# Patient Record
Sex: MALE | Race: OTHER | Employment: FULL TIME | URBAN - METROPOLITAN AREA
[De-identification: names, ages, dates, MRNs, and addresses within clinical notes are randomized per-mention and may not be internally consistent; named-entity substitution may affect disease eponyms.]

---

## 2017-03-10 ENCOUNTER — HOSPITAL ENCOUNTER (EMERGENCY)
Facility: HOSPITAL | Age: 42
Discharge: HOME | End: 2017-03-10
Attending: EMERGENCY MEDICINE
Payer: COMMERCIAL

## 2017-03-10 VITALS
WEIGHT: 240 LBS | HEIGHT: 69 IN | TEMPERATURE: 98 F | BODY MASS INDEX: 35.55 KG/M2 | RESPIRATION RATE: 18 BRPM | DIASTOLIC BLOOD PRESSURE: 67 MMHG | OXYGEN SATURATION: 96 % | HEART RATE: 88 BPM | SYSTOLIC BLOOD PRESSURE: 115 MMHG

## 2017-03-10 DIAGNOSIS — T78.1XXA ADVERSE REACTION TO FOOD, INITIAL ENCOUNTER: Primary | ICD-10-CM

## 2017-03-10 DIAGNOSIS — L50.9 URTICARIA: ICD-10-CM

## 2017-03-10 DIAGNOSIS — I95.1 ORTHOSTASIS: ICD-10-CM

## 2017-03-10 LAB
ALBUMIN SERPL BCP-MCNC: 3.4 G/DL (ref 3.5–4.8)
ALP SERPL-CCNC: 89 U/L (ref 32–100)
ALT SERPL-CCNC: 45 U/L (ref 17–63)
ANION GAP SERPL CALC-SCNC: 10 MMOL/L (ref 0–18)
AST SERPL-CCNC: 27 U/L (ref 15–41)
BASOPHILS # BLD: 0 K/UL (ref 0–0.2)
BASOPHILS NFR BLD: 1 %
BILIRUB DIRECT SERPL-MCNC: 0.1 MG/DL (ref 0–0.2)
BILIRUB SERPL-MCNC: 0.8 MG/DL (ref 0.3–1.2)
BUN SERPL-MCNC: 12 MG/DL (ref 8–20)
BUN/CREAT SERPL: 13.8 (ref 10–20)
CALCIUM SERPL-MCNC: 9.1 MG/DL (ref 8.5–10.5)
CHLORIDE SERPL-SCNC: 103 MMOL/L (ref 95–110)
CO2 SERPL-SCNC: 27 MMOL/L (ref 22–32)
CREAT SERPL-MCNC: 0.87 MG/DL (ref 0.5–1.5)
EOSINOPHIL # BLD: 0.1 K/UL (ref 0–0.7)
EOSINOPHIL NFR BLD: 1 %
ERYTHROCYTE [DISTWIDTH] IN BLOOD BY AUTOMATED COUNT: 14.4 % (ref 11–15)
GLUCOSE SERPL-MCNC: 170 MG/DL (ref 70–99)
HCT VFR BLD AUTO: 45.8 % (ref 41–52)
HGB BLD-MCNC: 15.2 G/DL (ref 13.5–17.5)
LIPASE SERPL-CCNC: 28 U/L (ref 22–51)
LYMPHOCYTES # BLD: 1.7 K/UL (ref 1–4)
LYMPHOCYTES NFR BLD: 17 %
MCH RBC QN AUTO: 28.4 PG (ref 27–32)
MCHC RBC AUTO-ENTMCNC: 33.2 G/DL (ref 32–37)
MCV RBC AUTO: 85.7 FL (ref 80–100)
MONOCYTES # BLD: 0.3 K/UL (ref 0–1)
MONOCYTES NFR BLD: 3 %
NEUTROPHILS # BLD AUTO: 7.9 K/UL (ref 1.8–7.7)
NEUTROPHILS NFR BLD: 79 %
OSMOLALITY UR CALC.SUM OF ELEC: 294 MOSM/KG (ref 275–295)
PLATELET # BLD AUTO: 123 K/UL (ref 140–400)
PMV BLD AUTO: 10.7 FL (ref 7.4–10.3)
POTASSIUM SERPL-SCNC: 3.6 MMOL/L (ref 3.3–5.1)
PROT SERPL-MCNC: 6.8 G/DL (ref 5.9–8.4)
RBC # BLD AUTO: 5.35 M/UL (ref 4.5–5.9)
SODIUM SERPL-SCNC: 140 MMOL/L (ref 136–144)
WBC # BLD AUTO: 10 K/UL (ref 4–11)

## 2017-03-10 PROCEDURE — S0028 INJECTION, FAMOTIDINE, 20 MG: HCPCS | Performed by: EMERGENCY MEDICINE

## 2017-03-10 PROCEDURE — 85025 COMPLETE CBC W/AUTO DIFF WBC: CPT | Performed by: EMERGENCY MEDICINE

## 2017-03-10 PROCEDURE — 99284 EMERGENCY DEPT VISIT MOD MDM: CPT

## 2017-03-10 PROCEDURE — 80076 HEPATIC FUNCTION PANEL: CPT | Performed by: EMERGENCY MEDICINE

## 2017-03-10 PROCEDURE — 96375 TX/PRO/DX INJ NEW DRUG ADDON: CPT

## 2017-03-10 PROCEDURE — 93005 ELECTROCARDIOGRAM TRACING: CPT

## 2017-03-10 PROCEDURE — 96374 THER/PROPH/DIAG INJ IV PUSH: CPT

## 2017-03-10 PROCEDURE — 96361 HYDRATE IV INFUSION ADD-ON: CPT

## 2017-03-10 PROCEDURE — 93010 ELECTROCARDIOGRAM REPORT: CPT | Performed by: EMERGENCY MEDICINE

## 2017-03-10 PROCEDURE — 80048 BASIC METABOLIC PNL TOTAL CA: CPT | Performed by: EMERGENCY MEDICINE

## 2017-03-10 PROCEDURE — 83690 ASSAY OF LIPASE: CPT | Performed by: EMERGENCY MEDICINE

## 2017-03-10 RX ORDER — ONDANSETRON 4 MG/1
4 TABLET, ORALLY DISINTEGRATING ORAL EVERY 6 HOURS PRN
Qty: 12 TABLET | Refills: 0 | Status: SHIPPED | OUTPATIENT
Start: 2017-03-10 | End: 2017-03-13

## 2017-03-10 RX ORDER — METHYLPREDNISOLONE SODIUM SUCCINATE 125 MG/2ML
125 INJECTION, POWDER, LYOPHILIZED, FOR SOLUTION INTRAMUSCULAR; INTRAVENOUS ONCE
Status: COMPLETED | OUTPATIENT
Start: 2017-03-10 | End: 2017-03-10

## 2017-03-10 RX ORDER — DIPHENHYDRAMINE HYDROCHLORIDE 50 MG/ML
INJECTION INTRAMUSCULAR; INTRAVENOUS
Status: COMPLETED
Start: 2017-03-10 | End: 2017-03-10

## 2017-03-10 RX ORDER — DIPHENHYDRAMINE HYDROCHLORIDE 50 MG/ML
50 INJECTION INTRAMUSCULAR; INTRAVENOUS ONCE
Status: COMPLETED | OUTPATIENT
Start: 2017-03-10 | End: 2017-03-10

## 2017-03-10 RX ORDER — PREDNISONE 20 MG/1
40 TABLET ORAL DAILY
Qty: 8 TABLET | Refills: 0 | Status: SHIPPED | OUTPATIENT
Start: 2017-03-10 | End: 2017-03-14

## 2017-03-10 RX ORDER — FAMOTIDINE 10 MG/ML
20 INJECTION, SOLUTION INTRAVENOUS ONCE
Status: COMPLETED | OUTPATIENT
Start: 2017-03-10 | End: 2017-03-10

## 2017-03-10 RX ORDER — FAMOTIDINE 20 MG/1
20 TABLET ORAL 2 TIMES DAILY
Qty: 28 TABLET | Refills: 0 | Status: SHIPPED | OUTPATIENT
Start: 2017-03-10 | End: 2017-03-24

## 2017-03-11 NOTE — ED NOTES
Pt stated that he is feeling much better, denies being dizzy, stated that he is sleepy from Benadryl, no signs of hives in his neck and face noted.

## 2017-03-11 NOTE — ED PROVIDER NOTES
Patient Seen in: [facility] AND [facility] Emergency Department    History   Patient presents with:  Dizziness (neurologic)    Stated Complaint: lightheaded nauseated     HPI    40 yo M without PMH presenting for evaluation of nausea associated with diaphoresis an Negative for chest pain and palpitations. Gastrointestinal: Negative for vomiting and abdominal pain. (+) nausea. Neurological: Negative for syncope and headaches. (+) lightheadedness.     Positive for stated complaint: lightheaded nauseated  Other syste ---------                               -----------         ------                     CBC W/ DIFFERENTIAL[[id number]]          Abnormal            Final result                 Please view results for these tests on the individual orders.    RAINBOW DRAW B 0    ondansetron 4 MG Oral Tablet Dispersible  Take 1 tablet (4 mg total) by mouth every 6 (six) hours as needed for Nausea. Qty: 12 tablet Refills: 0    predniSONE 20 MG Oral Tab  Take 2 tablets (40 mg total) by mouth daily.   Qty: 8 tablet Refills: 0

## 2017-03-11 NOTE — ED INITIAL ASSESSMENT (HPI)
Patient to ED via EMS for eval dizziness/nausea, which is resolving. Patient states he ate chinese food 1 hr ago and began to feel dizzy/nauseated. Appeared pale/cool/diaphoretic per EMS. Received 4mg zofran IVP. States he feels a little better.

## 2017-03-17 ENCOUNTER — OFFICE VISIT (OUTPATIENT)
Dept: FAMILY MEDICINE CLINIC | Facility: CLINIC | Age: 42
End: 2017-03-17

## 2017-03-17 ENCOUNTER — APPOINTMENT (OUTPATIENT)
Dept: LAB | Age: 42
End: 2017-03-17
Attending: FAMILY MEDICINE
Payer: COMMERCIAL

## 2017-03-17 VITALS
WEIGHT: 254 LBS | TEMPERATURE: 98 F | HEART RATE: 91 BPM | HEIGHT: 69 IN | BODY MASS INDEX: 37.62 KG/M2 | DIASTOLIC BLOOD PRESSURE: 93 MMHG | SYSTOLIC BLOOD PRESSURE: 142 MMHG

## 2017-03-17 DIAGNOSIS — G47.33 OSA (OBSTRUCTIVE SLEEP APNEA): ICD-10-CM

## 2017-03-17 DIAGNOSIS — T78.2XXA ANAPHYLAXIS, INITIAL ENCOUNTER: ICD-10-CM

## 2017-03-17 DIAGNOSIS — T78.2XXA ANAPHYLAXIS, INITIAL ENCOUNTER: Primary | ICD-10-CM

## 2017-03-17 PROCEDURE — 36415 COLL VENOUS BLD VENIPUNCTURE: CPT

## 2017-03-17 PROCEDURE — 99214 OFFICE O/P EST MOD 30 MIN: CPT | Performed by: FAMILY MEDICINE

## 2017-03-17 PROCEDURE — 86003 ALLG SPEC IGE CRUDE XTRC EA: CPT

## 2017-03-17 PROCEDURE — 82785 ASSAY OF IGE: CPT

## 2017-03-17 PROCEDURE — 99212 OFFICE O/P EST SF 10 MIN: CPT | Performed by: FAMILY MEDICINE

## 2017-03-17 RX ORDER — EPINEPHRINE 0.3 MG/.3ML
0.3 INJECTION SUBCUTANEOUS ONCE
Qty: 2 EACH | Refills: 5 | Status: SHIPPED | OUTPATIENT
Start: 2017-03-17 | End: 2017-03-17

## 2017-03-17 NOTE — PROGRESS NOTES
Blood pressure 142/93, pulse 91, temperature 98.2 °F (36.8 °C), temperature source Oral, height 5' 9\" (1.753 m), weight 254 lb (115.214 kg). Patient presents today following up for hospitalization for allergy to food that occurred about a week ago.   He r

## 2017-03-21 LAB
A ALTERNATA IGE QN: <0.1 KUA/L (ref ?–0.1)
C HERBARUM IGE QN: <0.1 KUA/L (ref ?–0.1)
CAT DANDER IGE QN: <0.1 KUA/L (ref ?–0.1)
CLAM IGE QN: 0.25 KUA/L (ref ?–0.1)
CODFISH IGE QN: <0.1 KUA/L (ref ?–0.1)
COMMON RAGWEED IGE QN: <0.1 KUA/L (ref ?–0.1)
CORN IGE QN: <0.1 KUA/L (ref ?–0.1)
COW MILK IGE QN: <0.1 KUA/L (ref ?–0.1)
D FARINAE IGE QN: 5.12 KUA/L (ref ?–0.1)
DOG DANDER IGE QN: <0.1 KUA/L (ref ?–0.1)
EGG WHITE IGE QN: <0.1 KUA/L (ref ?–0.1)
GOOSEFOOT IGE QN: <0.1 KUA/L (ref ?–0.1)
HOUSE DUST HS IGE QN: 0.62 KUA/L (ref ?–0.1)
IGE SERPL-ACNC: 121 KU/L (ref 2–214)
IGE SERPL-ACNC: 121 KU/L (ref 2–214)
KENT BLUE GRASS IGE QN: <0.1 KUA/L (ref ?–0.1)
PEANUT IGE QN: <0.1 KUA/L (ref ?–0.1)
PER RYE GRASS IGE QN: <0.1 KUA/L (ref ?–0.1)
SCALLOP IGE QN: <0.1 KUA/L (ref ?–0.1)
SESAME SEED IGE QN: <0.1 KUA/L (ref ?–0.1)
SHRIMP IGE QN: 0.51 KUA/L (ref ?–0.1)
SOYBEAN IGE QN: <0.1 KUA/L (ref ?–0.1)
WALNUT IGE QN: <0.1 KUA/L (ref ?–0.1)
WHEAT IGE QN: <0.1 KUA/L (ref ?–0.1)
WHITE ELM IGE QN: <0.1 KUA/L (ref ?–0.1)
WHITE OAK IGE QN: <0.1 KUA/L (ref ?–0.1)

## 2017-03-22 ENCOUNTER — TELEPHONE (OUTPATIENT)
Dept: FAMILY MEDICINE CLINIC | Facility: CLINIC | Age: 42
End: 2017-03-22

## 2017-03-22 DIAGNOSIS — G47.33 OSA (OBSTRUCTIVE SLEEP APNEA): Primary | ICD-10-CM

## 2017-03-28 NOTE — TELEPHONE ENCOUNTER
Unable to leave VM due to vm being full. Need to know if pt has scheduled sleep study or has been notified or ordered entered.  Please transfer to [phone] till 2pm today

## 2017-04-03 NOTE — TELEPHONE ENCOUNTER
Unable to leave VM due to mailbox being full. If pt call back please transfer to [phone] till 7pm today.

## 2017-04-10 ENCOUNTER — OFFICE VISIT (OUTPATIENT)
Dept: ALLERGY | Facility: CLINIC | Age: 42
End: 2017-04-10

## 2017-04-10 ENCOUNTER — NURSE ONLY (OUTPATIENT)
Dept: ALLERGY | Facility: CLINIC | Age: 42
End: 2017-04-10

## 2017-04-10 VITALS
HEART RATE: 88 BPM | HEIGHT: 69 IN | BODY MASS INDEX: 38.51 KG/M2 | OXYGEN SATURATION: 93 % | SYSTOLIC BLOOD PRESSURE: 130 MMHG | WEIGHT: 260 LBS | TEMPERATURE: 99 F | DIASTOLIC BLOOD PRESSURE: 90 MMHG | RESPIRATION RATE: 17 BRPM

## 2017-04-10 DIAGNOSIS — J30.9 ALLERGIC RHINITIS, UNSPECIFIED ALLERGIC RHINITIS TRIGGER, UNSPECIFIED RHINITIS SEASONALITY: Primary | ICD-10-CM

## 2017-04-10 DIAGNOSIS — Z91.018 FOOD ALLERGY: Primary | ICD-10-CM

## 2017-04-10 DIAGNOSIS — J30.9 ALLERGIC RHINITIS, UNSPECIFIED ALLERGIC RHINITIS TRIGGER, UNSPECIFIED RHINITIS SEASONALITY: ICD-10-CM

## 2017-04-10 DIAGNOSIS — T78.40XA ALLERGIC REACTION, INITIAL ENCOUNTER: ICD-10-CM

## 2017-04-10 PROCEDURE — 99212 OFFICE O/P EST SF 10 MIN: CPT | Performed by: ALLERGY & IMMUNOLOGY

## 2017-04-10 PROCEDURE — 99244 OFF/OP CNSLTJ NEW/EST MOD 40: CPT | Performed by: ALLERGY & IMMUNOLOGY

## 2017-04-10 PROCEDURE — 95024 IQ TESTS W/ALLERGENIC XTRCS: CPT | Performed by: ALLERGY & IMMUNOLOGY

## 2017-04-10 PROCEDURE — 95004 PERQ TESTS W/ALRGNC XTRCS: CPT | Performed by: ALLERGY & IMMUNOLOGY

## 2017-04-10 RX ORDER — EPINEPHRINE 0.3 MG/.3ML
INJECTION INTRAMUSCULAR
COMMUNITY
Start: 2017-03-17 | End: 2020-03-19

## 2017-04-10 NOTE — PROGRESS NOTES
Karolyn Valles is a 43year old male. HPI:   Patient presents with:  Food Allergy: nausea, red eyes, SOB, dizziness and tachycardia after eating at a chinese buffet.   Pt called 911, facial redness and swelling and hives on chest.  ER treated with shruti Pt has an epipen     Headed to [place] in 2-3 weeks for vacation     Hx of asthma, ad, or ar: denies         HISTORY:  Past Medical History   Diagnosis Date   • Myopia of both eyes 2001   • Other ill-defined conditions 2007     per Next[employer]:  \"[place], seizures  Psychiatric:  Negative for inappropriate interaction and psychiatric symptoms  Respiratory:  Negative for cough, dyspnea and wheezing      PHYSICAL EXAM:   Constitutional: responsive, no acute distress noted  Head/Face: NC/Atraumatic  Eyes/Vision ingestion, 3 hours.   Patient to contact my office if interested in pursuing oral challenge to Advil which is Motrin which is ibuprofen  Discussed with patient that Advil and Motrin are brand names for the generic ibuprofen  Recommend Tylenol in place of ib

## 2017-04-17 ENCOUNTER — TELEPHONE (OUTPATIENT)
Dept: FAMILY MEDICINE CLINIC | Facility: CLINIC | Age: 42
End: 2017-04-17

## 2017-04-17 NOTE — TELEPHONE ENCOUNTER
Per pt, he received an Order thru mail but would like to know what to do?  pls see comm dated 03/22/2017.

## 2017-04-17 NOTE — TELEPHONE ENCOUNTER
Procedure:  OP [facility] ALT REFERRAL HOME SLEEP APNEA TEST [[id number]]          Indications/Symptoms: Apnea  Comorbidities: (Check all that apply): None  Follow-Up Care: Post-test consultation with sleep specialist  [facility] Order #:   [id number]    Patient was info

## 2017-06-08 ENCOUNTER — OFFICE VISIT (OUTPATIENT)
Dept: SLEEP CENTER | Age: 42
End: 2017-06-08
Attending: FAMILY MEDICINE
Payer: COMMERCIAL

## 2017-06-08 DIAGNOSIS — G47.33 OSA (OBSTRUCTIVE SLEEP APNEA): Primary | ICD-10-CM

## 2017-06-08 PROCEDURE — 95806 SLEEP STUDY UNATT&RESP EFFT: CPT

## 2017-07-10 ENCOUNTER — OFFICE VISIT (OUTPATIENT)
Dept: FAMILY MEDICINE CLINIC | Facility: CLINIC | Age: 42
End: 2017-07-10

## 2017-07-10 ENCOUNTER — TELEPHONE (OUTPATIENT)
Dept: FAMILY MEDICINE CLINIC | Facility: CLINIC | Age: 42
End: 2017-07-10

## 2017-07-10 VITALS
DIASTOLIC BLOOD PRESSURE: 94 MMHG | HEIGHT: 68 IN | WEIGHT: 255 LBS | SYSTOLIC BLOOD PRESSURE: 141 MMHG | HEART RATE: 81 BPM | TEMPERATURE: 97 F | BODY MASS INDEX: 38.65 KG/M2

## 2017-07-10 DIAGNOSIS — J02.9 ACUTE PHARYNGITIS, UNSPECIFIED ETIOLOGY: ICD-10-CM

## 2017-07-10 DIAGNOSIS — J02.0 STREP PHARYNGITIS: Primary | ICD-10-CM

## 2017-07-10 LAB
CONTROL LINE PRESENT WITH A CLEAR BACKGROUND (YES/NO): YES YES/NO
KIT LOT #: ABNORMAL NUMERIC
STREP GRP A CUL-SCR: POSITIVE

## 2017-07-10 PROCEDURE — 99212 OFFICE O/P EST SF 10 MIN: CPT | Performed by: PHYSICIAN ASSISTANT

## 2017-07-10 PROCEDURE — 99213 OFFICE O/P EST LOW 20 MIN: CPT | Performed by: PHYSICIAN ASSISTANT

## 2017-07-10 PROCEDURE — 87880 STREP A ASSAY W/OPTIC: CPT | Performed by: PHYSICIAN ASSISTANT

## 2017-07-10 RX ORDER — AMOXICILLIN AND CLAVULANATE POTASSIUM 875; 125 MG/1; MG/1
1 TABLET, FILM COATED ORAL 2 TIMES DAILY
Qty: 20 TABLET | Refills: 0 | Status: SHIPPED | OUTPATIENT
Start: 2017-07-10 | End: 2017-07-20

## 2017-07-10 NOTE — TELEPHONE ENCOUNTER
Patient is inquiring about sleep study results done at [address] [place] [address] sleep center 6/8/17. Sleep study ordered by Dr. Nikole Castellano. Results are not in patient chart. Please obtain results from sleep center so patient can be notified of results.

## 2017-07-10 NOTE — PROGRESS NOTES
HPI:    Patient ID: Tana Ibarra is a 43year old male. Patient presents for sore throat for past five days. Patient has been out of town. He has had fever/chills. He has had mild congestion and cough which have improved.   He denies rash, abdomina and dry. Psychiatric: He has a normal mood and affect. Vitals reviewed. ASSESSMENT/PLAN:   Strep pharyngitis  (primary encounter diagnosis)  -Augmentin 875 mg BID for 10 days sent to pharmacy. Medication discussed.  Patient advised to take

## 2017-07-11 NOTE — TELEPHONE ENCOUNTER
Elo Bishop from [place] [address] stated that sleep study exam was done on 6/8/17,but was conducted oxymetry was not recorded. Pt will need to repeat sleep study exam. Elo Bishop stated she will contact patient to schedule repeat sleep study exam.No fu

## 2017-07-11 NOTE — TELEPHONE ENCOUNTER
Spoke to Pauline Vicente from [address] [place] [address] Sleep study department. She states that she call back me back regarding patient Sleep study results. Please transferred to [id number]

## 2017-07-20 ENCOUNTER — OFFICE VISIT (OUTPATIENT)
Dept: SLEEP CENTER | Age: 42
End: 2017-07-20
Attending: FAMILY MEDICINE
Payer: COMMERCIAL

## 2017-07-20 DIAGNOSIS — G47.33 OSA (OBSTRUCTIVE SLEEP APNEA): Primary | ICD-10-CM

## 2017-07-20 PROCEDURE — 95806 SLEEP STUDY UNATT&RESP EFFT: CPT

## 2017-07-25 ENCOUNTER — TELEPHONE (OUTPATIENT)
Dept: FAMILY MEDICINE CLINIC | Facility: CLINIC | Age: 42
End: 2017-07-25

## 2017-07-25 DIAGNOSIS — G47.33 OSA (OBSTRUCTIVE SLEEP APNEA): Primary | ICD-10-CM

## 2017-07-25 NOTE — TELEPHONE ENCOUNTER
Sleep Center - Lisa Naqvi calling states pt has failed two home sleep studies. Lisa Naqvi requesting orders for in lab diagnostic sleep study. Please call when orders approved to schedule pt.

## 2017-07-27 NOTE — TELEPHONE ENCOUNTER
Order has been placed. Awaiting for approval on referral. [place] from [address] has been informed.

## 2017-08-01 ENCOUNTER — TELEPHONE (OUTPATIENT)
Dept: FAMILY MEDICINE CLINIC | Facility: CLINIC | Age: 42
End: 2017-08-01

## 2017-08-01 NOTE — TELEPHONE ENCOUNTER
Actions Requested: appointment today or tomorrow-appointment made for tomorrow 8/02/17  Problem: earache  Onset and Timing: one week  Associated Symptoms: scant clear drainage x 1 last week, muffled hearing  Aggravating by: had  A cold 3 weeks ago  [facility] operation and will call back if needed. Patient was able to restate instructions in their own words, verbalizes understanding and agrees with plan.

## 2017-08-02 ENCOUNTER — OFFICE VISIT (OUTPATIENT)
Dept: INTERNAL MEDICINE CLINIC | Facility: CLINIC | Age: 42
End: 2017-08-02

## 2017-08-02 VITALS
BODY MASS INDEX: 39.86 KG/M2 | HEART RATE: 99 BPM | OXYGEN SATURATION: 93 % | TEMPERATURE: 99 F | WEIGHT: 263 LBS | SYSTOLIC BLOOD PRESSURE: 145 MMHG | HEIGHT: 68 IN | DIASTOLIC BLOOD PRESSURE: 85 MMHG

## 2017-08-02 DIAGNOSIS — H66.92 OTITIS OF LEFT EAR: Primary | ICD-10-CM

## 2017-08-02 DIAGNOSIS — H93.8X2 CONGESTION OF LEFT EAR: ICD-10-CM

## 2017-08-02 LAB
CONTROL LINE PRESENT WITH A CLEAR BACKGROUND (YES/NO): YES YES/NO
KIT LOT #: NORMAL NUMERIC

## 2017-08-02 PROCEDURE — 87880 STREP A ASSAY W/OPTIC: CPT | Performed by: INTERNAL MEDICINE

## 2017-08-02 PROCEDURE — 99212 OFFICE O/P EST SF 10 MIN: CPT | Performed by: INTERNAL MEDICINE

## 2017-08-02 PROCEDURE — 99214 OFFICE O/P EST MOD 30 MIN: CPT | Performed by: INTERNAL MEDICINE

## 2017-08-02 RX ORDER — NAPROXEN 500 MG/1
TABLET ORAL
Qty: 60 TABLET | Refills: 0 | Status: SHIPPED | OUTPATIENT
Start: 2017-08-02 | End: 2018-01-31

## 2017-08-02 RX ORDER — FLUTICASONE PROPIONATE 50 MCG
2 SPRAY, SUSPENSION (ML) NASAL DAILY
Qty: 3 BOTTLE | Refills: 3 | Status: SHIPPED | OUTPATIENT
Start: 2017-08-02 | End: 2018-07-28

## 2017-08-02 RX ORDER — OMEPRAZOLE 20 MG/1
20 CAPSULE, DELAYED RELEASE ORAL
Qty: 30 CAPSULE | Refills: 0 | Status: SHIPPED | OUTPATIENT
Start: 2017-08-02 | End: 2018-01-31

## 2017-08-02 RX ORDER — MONTELUKAST SODIUM 10 MG/1
10 TABLET ORAL NIGHTLY
Qty: 30 TABLET | Refills: 0 | Status: SHIPPED | OUTPATIENT
Start: 2017-08-02 | End: 2018-01-31

## 2017-08-02 NOTE — PATIENT INSTRUCTIONS
Congestion of left ear- physical exam is normal , overall congested ,- will try singular, nasal decongestant, if not feeling better , if any fever , pain call us   High bp - noted - advised to check bp at home or any local phramacy and call us with bp read

## 2017-08-02 NOTE — PROGRESS NOTES
HPI:    Patient ID: Vimal Angelo is a 43year old male.     HPI   He is  Here today for follow up on hs recent uri    He states that he finished abx , he feels better but for one week he is having  Fulness in his left ear , feels like bubbles, congested capsule (20 mg total) by mouth every morning before breakfast. Disp: 30 capsule Rfl: 0   naproxen 500 MG Oral Tab TAKE ONE TABLET BY MOUTH TWO TIMES A DAY WITH MEALS Disp: 60 tablet Rfl: 0   Montelukast Sodium 10 MG Oral Tab Take 1 tablet (10 mg total) by exhibits no maxillary sinus tenderness and no frontal sinus tenderness. Mouth/Throat: Uvula is midline, oropharynx is clear and moist and mucous membranes are normal. Mucous membranes are not cyanotic.  No oropharyngeal exudate, posterior oropharyngeal ed bp at home or any local phramacy and call us with bp reading in 2 days , advised for low salt diet and aerobic exercise 4 times a week for 45 min , follow p with pcp in one month      Orders Placed This Encounter      POC Rapid Strep [[id number]]    Meds This V

## 2017-08-24 ENCOUNTER — OFFICE VISIT (OUTPATIENT)
Dept: SLEEP CENTER | Age: 42
End: 2017-08-24
Attending: FAMILY MEDICINE
Payer: COMMERCIAL

## 2017-08-24 DIAGNOSIS — Z76.89 SLEEP CONCERN: Primary | ICD-10-CM

## 2017-08-24 PROCEDURE — 95811 POLYSOM 6/>YRS CPAP 4/> PARM: CPT

## 2017-08-27 NOTE — PROCEDURES
[address] [facility]  Accredited by the [facility] ([facility]ASM)    PATIENT'S NAME: Tera Marcos   ATTENDING PHYSICIAN: Melanie Trejo. Urmila Uriarte DO   REFERRING PHYSICIAN: Melanie Trejo.  Urmila Uriarte DO   PATIENT ACCOUNT #: [de-identified] LOCATION: and the spontaneous arousal index of 2.2 events per hour for a combined arousal index of 66.2 events per hour. There were no significant periodic limb movements. The lowest desaturation was to 55%.   The average heart rate was 81 beats per minute and ther normal during both the diagnostic and the treatment analysis, but there was dramatic REM rebound during the treatment analysis and supine REM was demonstrated on the final setting of 15 CWP.     INTERPRETATION:  The data generated from this study is consist

## 2017-08-28 ENCOUNTER — TELEPHONE (OUTPATIENT)
Dept: OTHER | Age: 42
End: 2017-08-28

## 2017-08-28 NOTE — TELEPHONE ENCOUNTER
LMTCB--pt to confirm consult appt with Dr. Carmen Hand 8/30 @ 3pm, [facility], pt needs location information.

## 2017-08-28 NOTE — TELEPHONE ENCOUNTER
Spoke with patient ( verified name and ), reviewed information, patient verbalized understanding and agrees with plan. Doctor [facility] name and number given to the patient. No further questions or concerns at this time.         Notes Recorded by Kellee Tuttle

## 2017-08-28 NOTE — TELEPHONE ENCOUNTER
Pt states he needs to schedule an appt with Dr. Baljit Evangelista for sleep apnea, pt received his sleep study results. He needs to be seen as soon as possible as he states the sleep apnea is effecting his job as a .  Pt was told the soonest available appt was no

## 2017-08-28 NOTE — TELEPHONE ENCOUNTER
Pt states he contacted Dr. Nolasco Standing office and there are no openings until sometime next month, states he was told to call back if he was unable to schedule an appt. New encounter sent to pulmonology clinical staff.

## 2017-08-30 ENCOUNTER — OFFICE VISIT (OUTPATIENT)
Dept: PULMONOLOGY | Facility: CLINIC | Age: 42
End: 2017-08-30

## 2017-08-30 VITALS
SYSTOLIC BLOOD PRESSURE: 135 MMHG | OXYGEN SATURATION: 99 % | WEIGHT: 268 LBS | DIASTOLIC BLOOD PRESSURE: 75 MMHG | HEIGHT: 69 IN | RESPIRATION RATE: 18 BRPM | HEART RATE: 97 BPM | BODY MASS INDEX: 39.69 KG/M2

## 2017-08-30 DIAGNOSIS — G47.33 OSA (OBSTRUCTIVE SLEEP APNEA): Primary | ICD-10-CM

## 2017-08-30 DIAGNOSIS — E66.09 OBESITY DUE TO EXCESS CALORIES WITHOUT SERIOUS COMORBIDITY, UNSPECIFIED CLASSIFICATION: ICD-10-CM

## 2017-08-30 PROCEDURE — 99243 OFF/OP CNSLTJ NEW/EST LOW 30: CPT | Performed by: INTERNAL MEDICINE

## 2017-08-30 PROCEDURE — 99212 OFFICE O/P EST SF 10 MIN: CPT | Performed by: INTERNAL MEDICINE

## 2017-08-30 RX ORDER — FLUTICASONE PROPIONATE 50 MCG
2 SPRAY, SUSPENSION (ML) NASAL DAILY
Qty: 1 INHALER | Refills: 4 | Status: SHIPPED | OUTPATIENT
Start: 2017-08-30 | End: 2017-09-29

## 2017-08-30 NOTE — PROGRESS NOTES
[facility], [facility], [facility]    Report of Consultation    Markirstyevelyn Noman Patient Status:  Outpatient    1975 MRN [id number]   Location [facility], [facility], [facility] facility-administered medications for this visit. (Not in a hospital admission)    Allergies  No Known Allergies    Review of Systems:    Pertinent items are noted in HPI. Physical Exam:   Blood pressure 135/75, pulse 97, resp.  rate 18, height 5' 9\

## 2017-09-01 ENCOUNTER — TELEPHONE (OUTPATIENT)
Dept: PULMONOLOGY | Facility: CLINIC | Age: 42
End: 2017-09-01

## 2017-09-01 ENCOUNTER — TELEPHONE (OUTPATIENT)
Dept: OTHER | Age: 42
End: 2017-09-01

## 2017-09-01 DIAGNOSIS — G47.30 SEVERE SLEEP APNEA: ICD-10-CM

## 2017-09-01 DIAGNOSIS — G47.30 SLEEP APNEA, UNSPECIFIED TYPE: Primary | ICD-10-CM

## 2017-09-01 NOTE — TELEPHONE ENCOUNTER
Notes Recorded by Lance Godinez DO on 8/28/2017 at 11:45 AM CDT  Polysomnogram positive for sleep apnea please refer patient to Dr. Navarro Moses.

## 2017-09-01 NOTE — TELEPHONE ENCOUNTER
LMTCB, please transfer to RN triage. Referral for Dr. Khushbu Rosario placed as written by Dr. Brenda Henriquez.

## 2017-09-05 NOTE — TELEPHONE ENCOUNTER
Verified name and . Results/recommendations reviewed with pt and pt verb understanding    Pt states already saw Dr Dorinda Clarke since Dr Kushal Mays was unavailable. Per pt Dr Rose De La Torre office will notify him when he can  CPAP machine.

## 2017-09-14 ENCOUNTER — TELEPHONE (OUTPATIENT)
Dept: PULMONOLOGY | Facility: CLINIC | Age: 42
End: 2017-09-14

## 2017-09-14 NOTE — TELEPHONE ENCOUNTER
Pt called [facility]ME re: Cpap Machine and supplies - states [facility]ME unable to process without Sleep Study results and office notes. Pls call [phone]. Thank you.

## 2017-10-02 ENCOUNTER — HOSPITAL ENCOUNTER (OUTPATIENT)
Dept: GENERAL RADIOLOGY | Facility: HOSPITAL | Age: 42
Discharge: HOME | End: 2017-10-02
Attending: PHYSICIAN ASSISTANT
Payer: COMMERCIAL

## 2017-10-02 ENCOUNTER — NURSE TRIAGE (OUTPATIENT)
Dept: OTHER | Age: 42
End: 2017-10-02

## 2017-10-02 ENCOUNTER — OFFICE VISIT (OUTPATIENT)
Dept: FAMILY MEDICINE CLINIC | Facility: CLINIC | Age: 42
End: 2017-10-02

## 2017-10-02 VITALS
DIASTOLIC BLOOD PRESSURE: 86 MMHG | RESPIRATION RATE: 20 BRPM | BODY MASS INDEX: 38.66 KG/M2 | TEMPERATURE: 98 F | OXYGEN SATURATION: 96 % | HEART RATE: 89 BPM | SYSTOLIC BLOOD PRESSURE: 150 MMHG | WEIGHT: 261 LBS | HEIGHT: 69 IN

## 2017-10-02 DIAGNOSIS — S20.212A CONTUSION OF LEFT CHEST WALL, INITIAL ENCOUNTER: Primary | ICD-10-CM

## 2017-10-02 DIAGNOSIS — S20.212A CONTUSION OF LEFT CHEST WALL, INITIAL ENCOUNTER: ICD-10-CM

## 2017-10-02 DIAGNOSIS — A09 TRAVELER'S DIARRHEA: ICD-10-CM

## 2017-10-02 PROCEDURE — 71101 X-RAY EXAM UNILAT RIBS/CHEST: CPT | Performed by: PHYSICIAN ASSISTANT

## 2017-10-02 PROCEDURE — 99212 OFFICE O/P EST SF 10 MIN: CPT | Performed by: PHYSICIAN ASSISTANT

## 2017-10-02 PROCEDURE — 99213 OFFICE O/P EST LOW 20 MIN: CPT | Performed by: PHYSICIAN ASSISTANT

## 2017-10-02 RX ORDER — CIPROFLOXACIN 500 MG/1
500 TABLET, FILM COATED ORAL 2 TIMES DAILY
Qty: 6 TABLET | Refills: 0 | Status: SHIPPED | OUTPATIENT
Start: 2017-10-02 | End: 2017-10-05

## 2017-10-02 NOTE — TELEPHONE ENCOUNTER
Reason for Disposition  • Patient wants to be seen    Protocols used: CHEST PAIN-A-OH  Action Requested: Summary for Provider     []  Critical Lab, Recommendations Needed  [] Need Additional Advice  []   FYI    []   Need Orders  [] Need Medications Sent

## 2017-10-03 NOTE — PROGRESS NOTES
HPI:    Patient ID: Jenn Pack is a 43year old male. Patient presents for pain left side ribs since fall sustained from slipping in hot tub few days ago. Patient states pain has worsened since initial injury.   He has pain with movement and with t and well-nourished. No distress. Cardiovascular: Normal rate, regular rhythm and normal heart sounds. Pulmonary/Chest: Effort normal and breath sounds normal. No respiratory distress. He exhibits tenderness (left side ribs). Abdominal: Soft.  Bowel s

## 2017-12-27 RX ORDER — OMEPRAZOLE 20 MG/1
20 CAPSULE, DELAYED RELEASE ORAL
Qty: 30 CAPSULE | Refills: 0
Start: 2017-12-27

## 2017-12-27 RX ORDER — NAPROXEN 500 MG/1
TABLET ORAL
Qty: 60 TABLET | Refills: 0
Start: 2017-12-27

## 2017-12-27 NOTE — TELEPHONE ENCOUNTER
From: Breanne Olivo  Sent: 12/27/2017 2:28 PM CST  Subject: Medication Renewal Request    Breanne Olivo would like a refill of the following medications:     omeprazole 20 MG Oral Capsule Delayed Release Chato Hogue MD]     naproxen 500 MG Oral Tab

## 2017-12-27 NOTE — TELEPHONE ENCOUNTER
Refill Protocol Appointment Criteria  · Appointment scheduled in the past 12 months or in the next 3 months  Recent Outpatient Visits            2 months ago Contusion of left chest wall, initial encounter    [facility] [place], [facility], [address]

## 2018-01-31 RX ORDER — MONTELUKAST SODIUM 10 MG/1
TABLET ORAL
Qty: 30 TABLET | Refills: 0 | Status: SHIPPED | OUTPATIENT
Start: 2018-01-31 | End: 2020-05-09

## 2018-01-31 RX ORDER — NAPROXEN 500 MG/1
TABLET ORAL
Qty: 60 TABLET | Refills: 0 | Status: SHIPPED | OUTPATIENT
Start: 2018-01-31 | End: 2020-03-19

## 2018-01-31 RX ORDER — OMEPRAZOLE 20 MG/1
CAPSULE, DELAYED RELEASE ORAL
Qty: 30 CAPSULE | Refills: 0 | Status: SHIPPED | OUTPATIENT
Start: 2018-01-31 | End: 2018-02-28

## 2018-01-31 NOTE — TELEPHONE ENCOUNTER
Refill protocol criteria met, rx sent.       Refill Protocol Appointment Criteria  · Appointment scheduled in the past 6 months or in the next 3 months  Recent Outpatient Visits            4 months ago Contusion of left chest wall, initial encounter    Rick

## 2018-02-05 ENCOUNTER — OFFICE VISIT (OUTPATIENT)
Dept: FAMILY MEDICINE CLINIC | Facility: CLINIC | Age: 43
End: 2018-02-05

## 2018-02-05 VITALS
HEART RATE: 97 BPM | DIASTOLIC BLOOD PRESSURE: 95 MMHG | BODY MASS INDEX: 41.11 KG/M2 | HEIGHT: 67.5 IN | SYSTOLIC BLOOD PRESSURE: 151 MMHG | WEIGHT: 265 LBS

## 2018-02-05 DIAGNOSIS — Z00.00 ROUTINE GENERAL MEDICAL EXAMINATION AT A HEALTH CARE FACILITY: Primary | ICD-10-CM

## 2018-02-05 DIAGNOSIS — R06.02 SHORTNESS OF BREATH: ICD-10-CM

## 2018-02-05 DIAGNOSIS — G47.33 OSA (OBSTRUCTIVE SLEEP APNEA): ICD-10-CM

## 2018-02-05 PROCEDURE — 99396 PREV VISIT EST AGE 40-64: CPT | Performed by: FAMILY MEDICINE

## 2018-02-05 NOTE — PROGRESS NOTES
HPI:    Patient ID: Vimal Angelo is a 43year old male.     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 Pulmonary/Chest: Effort normal and breath sounds normal.   Abdominal: Soft. Bowel sounds are normal.   Musculoskeletal: Normal range of motion. Neurological: He is alert and oriented to person, place, and time.  H

## 2018-02-06 ENCOUNTER — LAB ENCOUNTER (OUTPATIENT)
Dept: LAB | Age: 43
End: 2018-02-06
Attending: FAMILY MEDICINE
Payer: COMMERCIAL

## 2018-02-06 DIAGNOSIS — Z00.00 ROUTINE GENERAL MEDICAL EXAMINATION AT A HEALTH CARE FACILITY: ICD-10-CM

## 2018-02-06 LAB
ALBUMIN SERPL BCP-MCNC: 3.9 G/DL (ref 3.5–4.8)
ALBUMIN/GLOB SERPL: 1 {RATIO} (ref 1–2)
ALP SERPL-CCNC: 95 U/L (ref 32–100)
ALT SERPL-CCNC: 65 U/L (ref 17–63)
ANION GAP SERPL CALC-SCNC: 9 MMOL/L (ref 0–18)
AST SERPL-CCNC: 26 U/L (ref 15–41)
BASOPHILS # BLD: 0 K/UL (ref 0–0.2)
BASOPHILS NFR BLD: 0 %
BILIRUB SERPL-MCNC: 0.8 MG/DL (ref 0.3–1.2)
BUN SERPL-MCNC: 8 MG/DL (ref 8–20)
BUN/CREAT SERPL: 9.8 (ref 10–20)
CALCIUM SERPL-MCNC: 8.9 MG/DL (ref 8.5–10.5)
CHLORIDE SERPL-SCNC: 101 MMOL/L (ref 95–110)
CHOLEST SERPL-MCNC: 186 MG/DL (ref 110–200)
CO2 SERPL-SCNC: 28 MMOL/L (ref 22–32)
CREAT SERPL-MCNC: 0.82 MG/DL (ref 0.5–1.5)
EOSINOPHIL # BLD: 0.2 K/UL (ref 0–0.7)
EOSINOPHIL NFR BLD: 2 %
ERYTHROCYTE [DISTWIDTH] IN BLOOD BY AUTOMATED COUNT: 14.3 % (ref 11–15)
GLOBULIN PLAS-MCNC: 3.9 G/DL (ref 2.5–3.7)
GLUCOSE SERPL-MCNC: 143 MG/DL (ref 70–99)
HCT VFR BLD AUTO: 51.1 % (ref 41–52)
HDLC SERPL-MCNC: 34 MG/DL
HGB BLD-MCNC: 16.7 G/DL (ref 13.5–17.5)
LDLC SERPL CALC-MCNC: 112 MG/DL (ref 0–99)
LYMPHOCYTES # BLD: 2.1 K/UL (ref 1–4)
LYMPHOCYTES NFR BLD: 18 %
MCH RBC QN AUTO: 28.5 PG (ref 27–32)
MCHC RBC AUTO-ENTMCNC: 32.6 G/DL (ref 32–37)
MCV RBC AUTO: 87.4 FL (ref 80–100)
MONOCYTES # BLD: 0.6 K/UL (ref 0–1)
MONOCYTES NFR BLD: 5 %
NEUTROPHILS # BLD AUTO: 9 K/UL (ref 1.8–7.7)
NEUTROPHILS NFR BLD: 75 %
NONHDLC SERPL-MCNC: 152 MG/DL
OSMOLALITY UR CALC.SUM OF ELEC: 287 MOSM/KG (ref 275–295)
PATIENT FASTING: YES
PLATELET # BLD AUTO: 171 K/UL (ref 140–400)
PMV BLD AUTO: 11 FL (ref 7.4–10.3)
POTASSIUM SERPL-SCNC: 3.8 MMOL/L (ref 3.3–5.1)
PROT SERPL-MCNC: 7.8 G/DL (ref 5.9–8.4)
RBC # BLD AUTO: 5.85 M/UL (ref 4.5–5.9)
SODIUM SERPL-SCNC: 138 MMOL/L (ref 136–144)
TRIGL SERPL-MCNC: 199 MG/DL (ref 1–149)
WBC # BLD AUTO: 12 K/UL (ref 4–11)

## 2018-02-06 PROCEDURE — 36415 COLL VENOUS BLD VENIPUNCTURE: CPT

## 2018-02-06 PROCEDURE — 85025 COMPLETE CBC W/AUTO DIFF WBC: CPT

## 2018-02-06 PROCEDURE — 80061 LIPID PANEL: CPT

## 2018-02-06 PROCEDURE — 80053 COMPREHEN METABOLIC PANEL: CPT

## 2018-02-06 PROCEDURE — 87389 HIV-1 AG W/HIV-1&-2 AB AG IA: CPT

## 2018-02-06 PROCEDURE — 87591 N.GONORRHOEAE DNA AMP PROB: CPT

## 2018-02-06 PROCEDURE — 87491 CHLMYD TRACH DNA AMP PROBE: CPT

## 2018-02-07 LAB
C TRACH DNA SPEC QL NAA+PROBE: NEGATIVE
HIV1+2 AB SERPL QL IA: NONREACTIVE
N GONORRHOEA DNA SPEC QL NAA+PROBE: NEGATIVE

## 2018-02-09 ENCOUNTER — HOSPITAL ENCOUNTER (OUTPATIENT)
Dept: RESPIRATORY THERAPY | Facility: HOSPITAL | Age: 43
Discharge: HOME | End: 2018-02-09
Attending: FAMILY MEDICINE
Payer: COMMERCIAL

## 2018-02-09 DIAGNOSIS — R06.02 SHORTNESS OF BREATH: ICD-10-CM

## 2018-02-09 PROCEDURE — 94729 DIFFUSING CAPACITY: CPT | Performed by: INTERNAL MEDICINE

## 2018-02-09 PROCEDURE — 94060 EVALUATION OF WHEEZING: CPT | Performed by: INTERNAL MEDICINE

## 2018-02-09 PROCEDURE — 94726 PLETHYSMOGRAPHY LUNG VOLUMES: CPT | Performed by: INTERNAL MEDICINE

## 2018-02-14 NOTE — PROCEDURES
[facility]    Patient's Name Argelia Zamora MRN [id number]    1975 Pulmonologist Danyelle Preciado MD   Location [facility] PCP Marielle Alvarez.  DO Jai     IMPRESSION:    The PFTs are Normal.      Please click

## 2018-02-25 DIAGNOSIS — R73.9 HYPERGLYCEMIA: Primary | ICD-10-CM

## 2018-02-28 RX ORDER — OMEPRAZOLE 20 MG/1
CAPSULE, DELAYED RELEASE ORAL
Qty: 30 CAPSULE | Refills: 0 | Status: SHIPPED | OUTPATIENT
Start: 2018-02-28 | End: 2020-03-19

## 2018-02-28 RX ORDER — NAPROXEN 500 MG/1
TABLET ORAL
Qty: 60 TABLET | Refills: 0 | OUTPATIENT
Start: 2018-02-28

## 2019-05-22 RX ORDER — OMEPRAZOLE 20 MG/1
CAPSULE, DELAYED RELEASE ORAL
Qty: 30 CAPSULE | Refills: 0 | OUTPATIENT
Start: 2019-05-22

## 2019-05-23 RX ORDER — NAPROXEN 500 MG/1
TABLET ORAL
Qty: 60 TABLET | Refills: 0 | OUTPATIENT
Start: 2019-05-23

## 2019-05-23 RX ORDER — OMEPRAZOLE 20 MG/1
CAPSULE, DELAYED RELEASE ORAL
Qty: 90 CAPSULE | Refills: 1 | OUTPATIENT
Start: 2019-05-23

## 2019-05-23 NOTE — TELEPHONE ENCOUNTER
Requested Prescriptions     Pending Prescriptions Disp Refills   • omeprazole 20 MG Oral Capsule Delayed Release 30 capsule 0   • naproxen 500 MG Oral Tab 60 tablet 0         Recent Visits  No visits were found meeting these conditions.    Showing recent vi

## 2019-05-28 NOTE — TELEPHONE ENCOUNTER
CSS please contact patient to schedule f/u appt with Dr Nora Parr, thank you.  Please reply to pool: SHARON Teran

## 2019-05-29 NOTE — TELEPHONE ENCOUNTER
I called the patient to reinforce the need to schedule the appointment for his refill requests. He stated he will call back to schedule.

## 2020-03-12 ENCOUNTER — OFFICE VISIT (OUTPATIENT)
Dept: FAMILY MEDICINE CLINIC | Facility: CLINIC | Age: 45
End: 2020-03-12
Payer: MEDICAID

## 2020-03-12 VITALS
SYSTOLIC BLOOD PRESSURE: 142 MMHG | HEART RATE: 96 BPM | HEIGHT: 67.5 IN | RESPIRATION RATE: 18 BRPM | TEMPERATURE: 98 F | DIASTOLIC BLOOD PRESSURE: 92 MMHG | BODY MASS INDEX: 41.92 KG/M2 | WEIGHT: 270.25 LBS

## 2020-03-12 DIAGNOSIS — E66.9 DIABETES MELLITUS TYPE 2 IN OBESE (HCC): ICD-10-CM

## 2020-03-12 DIAGNOSIS — Z00.00 ROUTINE PHYSICAL EXAMINATION: Primary | ICD-10-CM

## 2020-03-12 DIAGNOSIS — E11.69 DIABETES MELLITUS TYPE 2 IN OBESE (HCC): ICD-10-CM

## 2020-03-12 PROCEDURE — 99396 PREV VISIT EST AGE 40-64: CPT | Performed by: FAMILY MEDICINE

## 2020-03-12 PROCEDURE — 99213 OFFICE O/P EST LOW 20 MIN: CPT | Performed by: FAMILY MEDICINE

## 2020-03-12 NOTE — PROGRESS NOTES
REASON FOR VISIT:    Kelle Kingston is a 40year old male who presents for an [address].     Concerned about weight gain has had elevated sugar in the past.    Patient Active Problem List:     Dermatophytosis of nail     Cervical radiculopat Hepatitis C Screening Screen pts at high risk plus screen one time for adults born [address] No results found for: HCVAB   Tuberculosis Screen If high risk No components found for: [address] Internal Lab or Procedure Macular degeneration Neg         No Family h/o Macular degeneration      SOCIAL HISTORY:   Social History    Tobacco Use      Smoking status: Never Smoker      Smokeless tobacco: Never Used    Alcohol use: Yes      Comment: occasional    Drug use: Not on f exam    GUIAC NEGATIVE   ASSESSMENT AND OTHER RELEVANT CHRONIC CONDITIONS:   Alisia Marsh is a 40year old male who presents for an [address]      PLAN SUMMARY:   Diagnoses and all orders for this visit:    Routine physical examination  -

## 2020-03-12 NOTE — PATIENT INSTRUCTIONS
THE OBESITY CODE (FIRST AND LAST CHAPTERS)    YOUTUBE/PODCAST AUTHOR DR. Essie Orantes     LOTRIMIN CREAM FOR BALANITIS

## 2020-03-14 ENCOUNTER — APPOINTMENT (OUTPATIENT)
Dept: LAB | Age: 45
End: 2020-03-14
Attending: FAMILY MEDICINE
Payer: MEDICAID

## 2020-03-14 DIAGNOSIS — Z00.00 ROUTINE PHYSICAL EXAMINATION: ICD-10-CM

## 2020-03-14 DIAGNOSIS — E66.9 DIABETES MELLITUS TYPE 2 IN OBESE (HCC): ICD-10-CM

## 2020-03-14 DIAGNOSIS — E11.69 DIABETES MELLITUS TYPE 2 IN OBESE (HCC): ICD-10-CM

## 2020-03-14 LAB
ALBUMIN SERPL-MCNC: 3.6 G/DL (ref 3.4–5)
ALBUMIN/GLOB SERPL: 0.8 {RATIO} (ref 1–2)
ALP LIVER SERPL-CCNC: 110 U/L (ref 45–117)
ALT SERPL-CCNC: 34 U/L (ref 16–61)
ANION GAP SERPL CALC-SCNC: 6 MMOL/L (ref 0–18)
AST SERPL-CCNC: 12 U/L (ref 15–37)
BILIRUB SERPL-MCNC: 0.8 MG/DL (ref 0.1–2)
BUN BLD-MCNC: 10 MG/DL (ref 7–18)
BUN/CREAT SERPL: 12.7 (ref 10–20)
CALCIUM BLD-MCNC: 8.7 MG/DL (ref 8.5–10.1)
CHLORIDE SERPL-SCNC: 105 MMOL/L (ref 98–112)
CHOLEST SMN-MCNC: 183 MG/DL (ref ?–200)
CO2 SERPL-SCNC: 29 MMOL/L (ref 21–32)
CREAT BLD-MCNC: 0.79 MG/DL (ref 0.7–1.3)
CREAT UR-SCNC: 166 MG/DL
EST. AVERAGE GLUCOSE BLD GHB EST-MCNC: 157 MG/DL (ref 68–126)
GLOBULIN PLAS-MCNC: 4.3 G/DL (ref 2.8–4.4)
GLUCOSE BLD-MCNC: 127 MG/DL (ref 70–99)
HBA1C MFR BLD HPLC: 7.1 % (ref ?–5.7)
HDLC SERPL-MCNC: 29 MG/DL (ref 40–59)
LDLC SERPL CALC-MCNC: 120 MG/DL (ref ?–100)
M PROTEIN MFR SERPL ELPH: 7.9 G/DL (ref 6.4–8.2)
MICROALBUMIN UR-MCNC: 1.82 MG/DL
MICROALBUMIN/CREAT 24H UR-RTO: 11 UG/MG (ref ?–30)
NONHDLC SERPL-MCNC: 154 MG/DL (ref ?–130)
OSMOLALITY SERPL CALC.SUM OF ELEC: 291 MOSM/KG (ref 275–295)
PATIENT FASTING Y/N/NP: YES
PATIENT FASTING Y/N/NP: YES
POTASSIUM SERPL-SCNC: 4.1 MMOL/L (ref 3.5–5.1)
SODIUM SERPL-SCNC: 140 MMOL/L (ref 136–145)
TRIGL SERPL-MCNC: 171 MG/DL (ref 30–149)
TSI SER-ACNC: 2.22 MIU/ML (ref 0.36–3.74)
VLDLC SERPL CALC-MCNC: 34 MG/DL (ref 0–30)

## 2020-03-14 PROCEDURE — 80053 COMPREHEN METABOLIC PANEL: CPT

## 2020-03-14 PROCEDURE — 80061 LIPID PANEL: CPT

## 2020-03-14 PROCEDURE — 82043 UR ALBUMIN QUANTITATIVE: CPT

## 2020-03-14 PROCEDURE — 84443 ASSAY THYROID STIM HORMONE: CPT

## 2020-03-14 PROCEDURE — 82570 ASSAY OF URINE CREATININE: CPT

## 2020-03-14 PROCEDURE — 83036 HEMOGLOBIN GLYCOSYLATED A1C: CPT

## 2020-03-14 PROCEDURE — 36415 COLL VENOUS BLD VENIPUNCTURE: CPT

## 2020-03-19 ENCOUNTER — OFFICE VISIT (OUTPATIENT)
Dept: FAMILY MEDICINE CLINIC | Facility: CLINIC | Age: 45
End: 2020-03-19
Payer: MEDICAID

## 2020-03-19 VITALS
DIASTOLIC BLOOD PRESSURE: 95 MMHG | WEIGHT: 271 LBS | HEART RATE: 80 BPM | SYSTOLIC BLOOD PRESSURE: 167 MMHG | TEMPERATURE: 98 F | HEIGHT: 67.5 IN | BODY MASS INDEX: 42.04 KG/M2

## 2020-03-19 DIAGNOSIS — E66.9 DIABETES MELLITUS TYPE 2 IN OBESE (HCC): Primary | ICD-10-CM

## 2020-03-19 DIAGNOSIS — E11.69 DIABETES MELLITUS TYPE 2 IN OBESE (HCC): Primary | ICD-10-CM

## 2020-03-19 PROCEDURE — 99214 OFFICE O/P EST MOD 30 MIN: CPT | Performed by: FAMILY MEDICINE

## 2020-03-19 PROCEDURE — 90471 IMMUNIZATION ADMIN: CPT | Performed by: FAMILY MEDICINE

## 2020-03-19 PROCEDURE — 90732 PPSV23 VACC 2 YRS+ SUBQ/IM: CPT | Performed by: FAMILY MEDICINE

## 2020-03-19 RX ORDER — NAPROXEN 500 MG/1
TABLET ORAL
Qty: 60 TABLET | Refills: 0 | Status: SHIPPED | OUTPATIENT
Start: 2020-03-19 | End: 2020-08-13

## 2020-03-19 RX ORDER — OMEPRAZOLE 20 MG/1
CAPSULE, DELAYED RELEASE ORAL
Qty: 30 CAPSULE | Refills: 0 | Status: SHIPPED | OUTPATIENT
Start: 2020-03-19 | End: 2020-08-13

## 2020-03-19 RX ORDER — ATORVASTATIN CALCIUM 20 MG/1
20 TABLET, FILM COATED ORAL NIGHTLY
Qty: 90 TABLET | Refills: 1 | Status: SHIPPED | OUTPATIENT
Start: 2020-03-19 | End: 2020-11-07

## 2020-03-19 RX ORDER — EPINEPHRINE 0.3 MG/.3ML
0.3 INJECTION INTRAMUSCULAR AS NEEDED
Qty: 2 EACH | Refills: 5 | Status: SHIPPED | OUTPATIENT
Start: 2020-03-19 | End: 2020-05-09

## 2020-03-19 RX ORDER — LOSARTAN POTASSIUM 50 MG/1
50 TABLET ORAL DAILY
Qty: 90 TABLET | Refills: 1 | Status: SHIPPED | OUTPATIENT
Start: 2020-03-19 | End: 2020-06-13

## 2020-03-19 NOTE — PATIENT INSTRUCTIONS
A1C  Does this test have other names? Hemoglobin A1c; HbA1c; glycosylated hemoglobin; glycohemoglobin; Glycated hemoglobin  What is this test?  A1C is a blood test that shows average blood sugar (glucose) levels over the last 3 months.  The test is done Test results may vary depending on your age, gender, health history, the method used for the test, and other things. Your test results may not mean you have a problem.  Ask your healthcare provider what your test results mean for you.   A1C results are repo © 7882-0875 The [address]. [address] [facility] [place] [address] [place]. All rights reserved. This information is not intended as a substitute for professional medical care. Always follow your healthcare professional's instructions.         Long-Jose A You can prevent these serious problems by managing your blood sugar, blood pressure, and cholesterol. This can help you feel better and stay healthy. You can control diabetes by tracking your blood sugar.  You can also eat healthy and exercise to prevent we

## 2020-03-19 NOTE — PROGRESS NOTES
Blood pressure (!) 167/95, pulse 80, temperature 98.2 °F (36.8 °C), height 5' 7.5\" (1.715 m), weight 271 lb (122.9 kg). Patient presents today following up for type 2 diabetes, hypertension and hyperlipidemia.   He started having elevated blood sugar re

## 2020-04-07 ENCOUNTER — TELEPHONE (OUTPATIENT)
Dept: FAMILY MEDICINE CLINIC | Facility: CLINIC | Age: 45
End: 2020-04-07

## 2020-04-08 NOTE — TELEPHONE ENCOUNTER
Shruthi Rudolph MA  Em Triage Support 15 hours ago (5:16 PM)      Please submit Prior-Auth to insurance for Epipen 2-Emanuel

## 2020-04-08 NOTE — TELEPHONE ENCOUNTER
Spoke to [facility] pharmacist who states medication was already picked up, No prior authorization is needed.

## 2020-05-09 ENCOUNTER — TELEPHONE (OUTPATIENT)
Dept: FAMILY MEDICINE CLINIC | Facility: CLINIC | Age: 45
End: 2020-05-09

## 2020-05-09 DIAGNOSIS — E66.9 DIABETES MELLITUS TYPE 2 IN OBESE (HCC): Primary | ICD-10-CM

## 2020-05-09 DIAGNOSIS — E11.69 DIABETES MELLITUS TYPE 2 IN OBESE (HCC): Primary | ICD-10-CM

## 2020-05-09 RX ORDER — EPINEPHRINE 0.3 MG/.3ML
0.3 INJECTION SUBCUTANEOUS ONCE
Qty: 1 EACH | Refills: 5 | Status: SHIPPED | OUTPATIENT
Start: 2020-05-09 | End: 2020-05-09

## 2020-06-13 ENCOUNTER — OFFICE VISIT (OUTPATIENT)
Dept: FAMILY MEDICINE CLINIC | Facility: CLINIC | Age: 45
End: 2020-06-13
Payer: MEDICAID

## 2020-06-13 VITALS
DIASTOLIC BLOOD PRESSURE: 97 MMHG | WEIGHT: 264 LBS | HEART RATE: 80 BPM | HEIGHT: 67.5 IN | SYSTOLIC BLOOD PRESSURE: 161 MMHG | BODY MASS INDEX: 40.95 KG/M2 | TEMPERATURE: 98 F

## 2020-06-13 DIAGNOSIS — E11.69 DIABETES MELLITUS TYPE 2 IN OBESE (HCC): Primary | ICD-10-CM

## 2020-06-13 DIAGNOSIS — E66.9 DIABETES MELLITUS TYPE 2 IN OBESE (HCC): Primary | ICD-10-CM

## 2020-06-13 PROCEDURE — 99213 OFFICE O/P EST LOW 20 MIN: CPT | Performed by: FAMILY MEDICINE

## 2020-06-13 RX ORDER — LOSARTAN POTASSIUM 100 MG/1
100 TABLET ORAL DAILY
Qty: 30 TABLET | Refills: 1 | Status: SHIPPED | OUTPATIENT
Start: 2020-06-13 | End: 2020-08-18

## 2020-06-13 NOTE — PROGRESS NOTES
Blood pressure (!) 161/97, pulse 80, temperature 98.4 °F (36.9 °C), height 5' 7.5\" (1.715 m), weight 264 lb (119.7 kg). Following up for diabetes, hypertension and hyperlipidemia. Denies chest pain or dyspnea. Describes his job is stressful.     Marcella Climes

## 2020-08-14 RX ORDER — OMEPRAZOLE 20 MG/1
CAPSULE, DELAYED RELEASE ORAL
Qty: 30 CAPSULE | Refills: 0 | Status: SHIPPED | OUTPATIENT
Start: 2020-08-14 | End: 2021-04-19

## 2020-08-14 RX ORDER — NAPROXEN 500 MG/1
TABLET ORAL
Qty: 60 TABLET | Refills: 0 | Status: SHIPPED | OUTPATIENT
Start: 2020-08-14 | End: 2021-04-19

## 2020-08-15 ENCOUNTER — APPOINTMENT (OUTPATIENT)
Dept: LAB | Age: 45
End: 2020-08-15
Attending: FAMILY MEDICINE
Payer: MEDICAID

## 2020-08-15 DIAGNOSIS — E11.69 DIABETES MELLITUS TYPE 2 IN OBESE (HCC): ICD-10-CM

## 2020-08-15 DIAGNOSIS — E66.9 DIABETES MELLITUS TYPE 2 IN OBESE (HCC): ICD-10-CM

## 2020-08-15 LAB
ANION GAP SERPL CALC-SCNC: 5 MMOL/L (ref 0–18)
BUN BLD-MCNC: 14 MG/DL (ref 7–18)
BUN/CREAT SERPL: 16.9 (ref 10–20)
CALCIUM BLD-MCNC: 8.3 MG/DL (ref 8.5–10.1)
CHLORIDE SERPL-SCNC: 107 MMOL/L (ref 98–112)
CO2 SERPL-SCNC: 27 MMOL/L (ref 21–32)
CREAT BLD-MCNC: 0.83 MG/DL (ref 0.7–1.3)
CREAT UR-SCNC: 109 MG/DL
EST. AVERAGE GLUCOSE BLD GHB EST-MCNC: 140 MG/DL (ref 68–126)
GLUCOSE BLD-MCNC: 135 MG/DL (ref 70–99)
HBA1C MFR BLD HPLC: 6.5 % (ref ?–5.7)
MICROALBUMIN UR-MCNC: 0.99 MG/DL
MICROALBUMIN/CREAT 24H UR-RTO: 9.1 UG/MG (ref ?–30)
OSMOLALITY SERPL CALC.SUM OF ELEC: 291 MOSM/KG (ref 275–295)
PATIENT FASTING Y/N/NP: YES
POTASSIUM SERPL-SCNC: 4 MMOL/L (ref 3.5–5.1)
SODIUM SERPL-SCNC: 139 MMOL/L (ref 136–145)

## 2020-08-15 PROCEDURE — 36415 COLL VENOUS BLD VENIPUNCTURE: CPT

## 2020-08-15 PROCEDURE — 83036 HEMOGLOBIN GLYCOSYLATED A1C: CPT

## 2020-08-15 PROCEDURE — 82570 ASSAY OF URINE CREATININE: CPT

## 2020-08-15 PROCEDURE — 82043 UR ALBUMIN QUANTITATIVE: CPT

## 2020-08-15 PROCEDURE — 80048 BASIC METABOLIC PNL TOTAL CA: CPT

## 2020-08-18 ENCOUNTER — OFFICE VISIT (OUTPATIENT)
Dept: FAMILY MEDICINE CLINIC | Facility: CLINIC | Age: 45
End: 2020-08-18
Payer: MEDICAID

## 2020-08-18 VITALS
DIASTOLIC BLOOD PRESSURE: 78 MMHG | HEART RATE: 76 BPM | SYSTOLIC BLOOD PRESSURE: 130 MMHG | WEIGHT: 262 LBS | HEIGHT: 67.5 IN | BODY MASS INDEX: 40.64 KG/M2

## 2020-08-18 DIAGNOSIS — I10 ESSENTIAL HYPERTENSION: ICD-10-CM

## 2020-08-18 DIAGNOSIS — E11.69 DIABETES MELLITUS TYPE 2 IN OBESE (HCC): Primary | ICD-10-CM

## 2020-08-18 DIAGNOSIS — E66.9 DIABETES MELLITUS TYPE 2 IN OBESE (HCC): Primary | ICD-10-CM

## 2020-08-18 PROCEDURE — 99214 OFFICE O/P EST MOD 30 MIN: CPT | Performed by: FAMILY MEDICINE

## 2020-08-18 PROCEDURE — 3008F BODY MASS INDEX DOCD: CPT | Performed by: FAMILY MEDICINE

## 2020-08-18 PROCEDURE — 3078F DIAST BP <80 MM HG: CPT | Performed by: FAMILY MEDICINE

## 2020-08-18 PROCEDURE — 3075F SYST BP GE 130 - 139MM HG: CPT | Performed by: FAMILY MEDICINE

## 2020-08-18 RX ORDER — TERBINAFINE HYDROCHLORIDE 250 MG/1
250 TABLET ORAL DAILY
Qty: 90 TABLET | Refills: 0 | Status: SHIPPED | OUTPATIENT
Start: 2020-08-18 | End: 2020-11-16

## 2020-08-18 RX ORDER — LOSARTAN POTASSIUM 100 MG/1
100 TABLET ORAL DAILY
Qty: 30 TABLET | Refills: 5 | Status: SHIPPED | OUTPATIENT
Start: 2020-08-18 | End: 2021-04-19

## 2020-08-18 NOTE — PROGRESS NOTES
Blood pressure 130/78, pulse 76, height 5' 7.5\" (1.715 m), weight 262 lb (118.8 kg). Following up for diabetes. Some polyuria some  polydipsia denies any visual problems. Was diagnosed with diabetes less than a year ago.     Also with hypertension den

## 2020-08-18 NOTE — PATIENT INSTRUCTIONS
CHOLINERGIC URTICARIA  TAKE ALLEGRA ( FEXOFENADINE) 180MG ONE TAB DAILY     THE OBESITY CODE (FIRST AND LAST CHAPTERS)    YOUTUBE/PODCAST AUTHOR DR. Roger Billings

## 2020-08-27 ENCOUNTER — OFFICE VISIT (OUTPATIENT)
Dept: OPTOMETRY | Facility: CLINIC | Age: 45
End: 2020-08-27
Payer: MEDICAID

## 2020-08-27 DIAGNOSIS — H52.13 MYOPIA OF BOTH EYES: ICD-10-CM

## 2020-08-27 DIAGNOSIS — E11.9 CONTROLLED TYPE 2 DIABETES MELLITUS WITHOUT COMPLICATION, WITHOUT LONG-TERM CURRENT USE OF INSULIN (HCC): Primary | ICD-10-CM

## 2020-08-27 PROCEDURE — 92004 COMPRE OPH EXAM NEW PT 1/>: CPT | Performed by: OPTOMETRIST

## 2020-08-27 PROCEDURE — 92015 DETERMINE REFRACTIVE STATE: CPT | Performed by: OPTOMETRIST

## 2020-08-27 NOTE — ASSESSMENT & PLAN NOTE
New glasses RX given to update as needed.  I advised that there will be some adaptation to new RX/progressive

## 2020-08-27 NOTE — PATIENT INSTRUCTIONS
Myopia of both eyes  New glasses RX given to update as needed.  I advised that there will be some adaptation to new RX/progressive      Controlled type 2 diabetes mellitus without complication, without long-term current use of insulin ([address]  I advised patie

## 2020-08-27 NOTE — PROGRESS NOTES
Zack Lafleur is a 39year old male. HPI:     HPI     Diabetic Eye Exam     Diabetes characteristics include Type 2, controlled with diet and taking oral medications. Duration of 5 months. Number of years diabetic: 5 months.   Number of years on pill Smokeless tobacco: Never Used    Alcohol use: Yes      Comment: occasional    Drug use: Not on file      Medications:  Current Outpatient Medications   Medication Sig Dispense Refill   • metFORMIN HCl 500 MG Oral Tab Take 1 tablet (500 mg total) by mouth d Exam       Right Left    Lids/Lashes Meibomian gland dysfunction Meibomian gland dysfunction    Conjunctiva/Sclera Normal Normal    Cornea LASIK flap intact.  minimal scar inferior LASIK flap intact temp and inferior scar    Anterior Chamber Deep and quiet file.    8/27/2020  Scribed by: Lizbeth Durán

## 2020-09-02 ENCOUNTER — TELEPHONE (OUTPATIENT)
Dept: OPTOMETRY | Facility: CLINIC | Age: 45
End: 2020-09-02

## 2020-09-02 NOTE — TELEPHONE ENCOUNTER
Per pt wanting to know if Dr. Marta Holliday can provide website where pt can get a discount on prescription glasses.  Please advise

## 2020-11-07 RX ORDER — ATORVASTATIN CALCIUM 20 MG/1
20 TABLET, FILM COATED ORAL NIGHTLY
Qty: 90 TABLET | Refills: 1 | Status: SHIPPED | OUTPATIENT
Start: 2020-11-07 | End: 2021-04-19

## 2020-12-05 ENCOUNTER — OFFICE VISIT (OUTPATIENT)
Dept: FAMILY MEDICINE CLINIC | Facility: CLINIC | Age: 45
End: 2020-12-05
Payer: MEDICAID

## 2020-12-05 VITALS
SYSTOLIC BLOOD PRESSURE: 130 MMHG | HEIGHT: 67.5 IN | BODY MASS INDEX: 40.18 KG/M2 | DIASTOLIC BLOOD PRESSURE: 80 MMHG | HEART RATE: 80 BPM | WEIGHT: 259 LBS

## 2020-12-05 DIAGNOSIS — E66.9 DIABETES MELLITUS TYPE 2 IN OBESE (HCC): Primary | ICD-10-CM

## 2020-12-05 DIAGNOSIS — E11.69 DIABETES MELLITUS TYPE 2 IN OBESE (HCC): Primary | ICD-10-CM

## 2020-12-05 PROCEDURE — 3075F SYST BP GE 130 - 139MM HG: CPT | Performed by: FAMILY MEDICINE

## 2020-12-05 PROCEDURE — 3079F DIAST BP 80-89 MM HG: CPT | Performed by: FAMILY MEDICINE

## 2020-12-05 PROCEDURE — 99213 OFFICE O/P EST LOW 20 MIN: CPT | Performed by: FAMILY MEDICINE

## 2020-12-05 PROCEDURE — 3008F BODY MASS INDEX DOCD: CPT | Performed by: FAMILY MEDICINE

## 2020-12-05 NOTE — PROGRESS NOTES
Blood pressure 130/80, pulse 80, height 5' 7.5\" (1.715 m), weight 259 lb (117.5 kg). Denies chest pain or dyspnea denies hypoglycemia. Denies polyuria polydipsia no visual complaints. Still with fungus on the toenails.     Objective feet with good p

## 2020-12-17 ENCOUNTER — OFFICE VISIT (OUTPATIENT)
Dept: FAMILY MEDICINE CLINIC | Facility: CLINIC | Age: 45
End: 2020-12-17
Payer: MEDICAID

## 2020-12-17 VITALS
BODY MASS INDEX: 40.02 KG/M2 | DIASTOLIC BLOOD PRESSURE: 84 MMHG | WEIGHT: 258 LBS | HEIGHT: 67.5 IN | SYSTOLIC BLOOD PRESSURE: 148 MMHG | HEART RATE: 84 BPM

## 2020-12-17 DIAGNOSIS — H10.9 BACTERIAL CONJUNCTIVITIS OF RIGHT EYE: Primary | ICD-10-CM

## 2020-12-17 PROCEDURE — 99213 OFFICE O/P EST LOW 20 MIN: CPT | Performed by: PHYSICIAN ASSISTANT

## 2020-12-17 PROCEDURE — 3008F BODY MASS INDEX DOCD: CPT | Performed by: PHYSICIAN ASSISTANT

## 2020-12-17 PROCEDURE — 3077F SYST BP >= 140 MM HG: CPT | Performed by: PHYSICIAN ASSISTANT

## 2020-12-17 PROCEDURE — 3079F DIAST BP 80-89 MM HG: CPT | Performed by: PHYSICIAN ASSISTANT

## 2020-12-17 RX ORDER — TOBRAMYCIN AND DEXAMETHASONE 3; 1 MG/ML; MG/ML
2 SUSPENSION/ DROPS OPHTHALMIC
Qty: 1 BOTTLE | Refills: 0 | Status: SHIPPED | OUTPATIENT
Start: 2020-12-17 | End: 2020-12-24

## 2020-12-17 NOTE — PROGRESS NOTES
HPI:   Eye Problem   The right eye is affected. This is a new problem. Episode onset: 3 days. The problem has been gradually worsening. The pain is mild. There is no known exposure to pink eye. He does not wear contacts.  Associated symptoms include an eye Procedure Laterality Date   • Lasik  2002   • Lasik  2001    Lasik, OU       Family History:     Family History   Problem Relation Age of Onset   • Lipids Mother    • Hypertension Mother    • Diabetes Mother    • Other (Other) Other         No Family ret Occupational Exposure: Not Asked        Hobby Hazards: Not Asked        Sleep Concern: Not Asked        Stress Concern: Not Asked        Weight Concern: Not Asked        Special Diet: Not Asked        Back Care: Not Asked        Exercise: Not Asked murmur heard. Pulmonary/Chest: Effort normal and breath sounds normal. He has no wheezes. He has no rales. He exhibits no tenderness. Lymphadenopathy:     He has no cervical adenopathy.    Neurological: He is alert and oriented to person, place, and time

## 2020-12-17 NOTE — PATIENT INSTRUCTIONS
Bacterial Conjunctivitis    You have an infection in the membranes covering the white part of the eye. This part of the eye is called the conjunctiva. The infection is called conjunctivitis.  The most common symptoms of conjunctivitis include a thick, pus educational content on 4/1/2020  © 9303-7090 [employer] [address]. [address] [facility] [place] [address] [place]. All rights reserved. This information is not intended as a substitute for professional medical care.  Always follow your healthcare profession

## 2021-03-27 ENCOUNTER — IMMUNIZATION (OUTPATIENT)
Dept: LAB | Age: 46
End: 2021-03-27
Attending: HOSPITALIST
Payer: COMMERCIAL

## 2021-03-27 DIAGNOSIS — Z23 NEED FOR VACCINATION: Primary | ICD-10-CM

## 2021-03-27 PROCEDURE — 0001A SARSCOV2 VAC 30MCG/0.3ML IM: CPT

## 2021-03-29 ENCOUNTER — TELEPHONE (OUTPATIENT)
Dept: FAMILY MEDICINE CLINIC | Facility: CLINIC | Age: 46
End: 2021-03-29

## 2021-04-17 ENCOUNTER — IMMUNIZATION (OUTPATIENT)
Dept: LAB | Age: 46
End: 2021-04-17
Attending: HOSPITALIST
Payer: COMMERCIAL

## 2021-04-17 DIAGNOSIS — Z23 NEED FOR VACCINATION: Primary | ICD-10-CM

## 2021-04-17 PROCEDURE — 0002A SARSCOV2 VAC 30MCG/0.3ML IM: CPT

## 2021-04-19 DIAGNOSIS — E66.9 DIABETES MELLITUS TYPE 2 IN OBESE: ICD-10-CM

## 2021-04-19 DIAGNOSIS — E11.69 DIABETES MELLITUS TYPE 2 IN OBESE: ICD-10-CM

## 2021-04-20 RX ORDER — NAPROXEN 500 MG/1
TABLET ORAL
Qty: 60 TABLET | Refills: 0 | Status: SHIPPED | OUTPATIENT
Start: 2021-04-20 | End: 2021-07-07

## 2021-04-20 RX ORDER — OMEPRAZOLE 20 MG/1
CAPSULE, DELAYED RELEASE ORAL
Qty: 30 CAPSULE | Refills: 0 | Status: SHIPPED | OUTPATIENT
Start: 2021-04-20 | End: 2023-09-26

## 2021-04-20 RX ORDER — LOSARTAN POTASSIUM 100 MG/1
100 TABLET ORAL DAILY
Qty: 30 TABLET | Refills: 1 | Status: SHIPPED | OUTPATIENT
Start: 2021-04-20 | End: 2021-07-07

## 2021-04-20 RX ORDER — ATORVASTATIN CALCIUM 20 MG/1
20 TABLET, FILM COATED ORAL NIGHTLY
Qty: 90 TABLET | Refills: 0 | Status: SHIPPED | OUTPATIENT
Start: 2021-04-20 | End: 2021-07-07

## 2021-06-11 ENCOUNTER — HOSPITAL ENCOUNTER (EMERGENCY)
Facility: HOSPITAL | Age: 46
Discharge: LEFT BEFORE DISCHARGE | End: 2021-06-11
Payer: MEDICAID

## 2021-06-11 VITALS
HEIGHT: 69 IN | HEART RATE: 93 BPM | DIASTOLIC BLOOD PRESSURE: 82 MMHG | SYSTOLIC BLOOD PRESSURE: 154 MMHG | WEIGHT: 260 LBS | BODY MASS INDEX: 38.51 KG/M2 | OXYGEN SATURATION: 96 % | TEMPERATURE: 99 F | RESPIRATION RATE: 24 BRPM

## 2021-06-12 NOTE — ED INITIAL ASSESSMENT (HPI)
Left mid to lower abdominal pain since approximately 2015. Reports having similar pain a few days ago, but that it resolved on its own. Denies N/V/D, fevers.

## 2021-06-14 ENCOUNTER — HOSPITAL ENCOUNTER (EMERGENCY)
Facility: HOSPITAL | Age: 46
Discharge: HOME | End: 2021-06-14
Attending: EMERGENCY MEDICINE
Payer: MEDICAID

## 2021-06-14 ENCOUNTER — APPOINTMENT (OUTPATIENT)
Dept: CT IMAGING | Facility: HOSPITAL | Age: 46
End: 2021-06-14
Attending: EMERGENCY MEDICINE
Payer: MEDICAID

## 2021-06-14 ENCOUNTER — OFFICE VISIT (OUTPATIENT)
Dept: FAMILY MEDICINE CLINIC | Facility: CLINIC | Age: 46
End: 2021-06-14
Payer: MEDICAID

## 2021-06-14 VITALS
HEIGHT: 67.5 IN | SYSTOLIC BLOOD PRESSURE: 138 MMHG | TEMPERATURE: 99 F | DIASTOLIC BLOOD PRESSURE: 81 MMHG | BODY MASS INDEX: 40.18 KG/M2 | HEART RATE: 80 BPM | WEIGHT: 259 LBS

## 2021-06-14 VITALS
WEIGHT: 260 LBS | OXYGEN SATURATION: 98 % | HEART RATE: 68 BPM | DIASTOLIC BLOOD PRESSURE: 93 MMHG | TEMPERATURE: 99 F | RESPIRATION RATE: 18 BRPM | BODY MASS INDEX: 40 KG/M2 | SYSTOLIC BLOOD PRESSURE: 147 MMHG

## 2021-06-14 DIAGNOSIS — N30.00 ACUTE CYSTITIS WITHOUT HEMATURIA: ICD-10-CM

## 2021-06-14 DIAGNOSIS — K59.00 CONSTIPATION, UNSPECIFIED CONSTIPATION TYPE: Primary | ICD-10-CM

## 2021-06-14 DIAGNOSIS — K57.90 DIVERTICULOSIS: ICD-10-CM

## 2021-06-14 DIAGNOSIS — R10.9 LEFT SIDED ABDOMINAL PAIN: Primary | ICD-10-CM

## 2021-06-14 PROCEDURE — 99213 OFFICE O/P EST LOW 20 MIN: CPT | Performed by: NURSE PRACTITIONER

## 2021-06-14 PROCEDURE — 87086 URINE CULTURE/COLONY COUNT: CPT

## 2021-06-14 PROCEDURE — 3008F BODY MASS INDEX DOCD: CPT | Performed by: NURSE PRACTITIONER

## 2021-06-14 PROCEDURE — 3079F DIAST BP 80-89 MM HG: CPT | Performed by: NURSE PRACTITIONER

## 2021-06-14 PROCEDURE — 99284 EMERGENCY DEPT VISIT MOD MDM: CPT

## 2021-06-14 PROCEDURE — 3075F SYST BP GE 130 - 139MM HG: CPT | Performed by: NURSE PRACTITIONER

## 2021-06-14 PROCEDURE — 80048 BASIC METABOLIC PNL TOTAL CA: CPT | Performed by: EMERGENCY MEDICINE

## 2021-06-14 PROCEDURE — 74177 CT ABD & PELVIS W/CONTRAST: CPT | Performed by: EMERGENCY MEDICINE

## 2021-06-14 PROCEDURE — 81001 URINALYSIS AUTO W/SCOPE: CPT | Performed by: EMERGENCY MEDICINE

## 2021-06-14 PROCEDURE — 87086 URINE CULTURE/COLONY COUNT: CPT | Performed by: EMERGENCY MEDICINE

## 2021-06-14 PROCEDURE — 85025 COMPLETE CBC W/AUTO DIFF WBC: CPT

## 2021-06-14 PROCEDURE — 81001 URINALYSIS AUTO W/SCOPE: CPT

## 2021-06-14 PROCEDURE — 80048 BASIC METABOLIC PNL TOTAL CA: CPT

## 2021-06-14 PROCEDURE — 85025 COMPLETE CBC W/AUTO DIFF WBC: CPT | Performed by: EMERGENCY MEDICINE

## 2021-06-14 PROCEDURE — 36415 COLL VENOUS BLD VENIPUNCTURE: CPT

## 2021-06-14 RX ORDER — MAGNESIUM CARB/ALUMINUM HYDROX 105-160MG
296 TABLET,CHEWABLE ORAL ONCE
Qty: 296 ML | Refills: 0 | Status: SHIPPED | OUTPATIENT
Start: 2021-06-14 | End: 2021-06-14

## 2021-06-14 RX ORDER — POLYETHYLENE GLYCOL 3350 17 G/17G
17 POWDER, FOR SOLUTION ORAL DAILY PRN
Qty: 12 EACH | Refills: 0 | Status: SHIPPED | OUTPATIENT
Start: 2021-06-14 | End: 2021-07-14

## 2021-06-14 RX ORDER — DOCUSATE SODIUM 100 MG/1
100 CAPSULE, LIQUID FILLED ORAL 2 TIMES DAILY
Qty: 60 CAPSULE | Refills: 0 | Status: SHIPPED | OUTPATIENT
Start: 2021-06-14 | End: 2021-07-14

## 2021-06-14 RX ORDER — CEFUROXIME AXETIL 500 MG/1
500 TABLET ORAL 2 TIMES DAILY
Qty: 14 TABLET | Refills: 0 | Status: SHIPPED | OUTPATIENT
Start: 2021-06-14 | End: 2021-06-21

## 2021-06-14 RX ORDER — CEFUROXIME AXETIL 500 MG/1
500 TABLET ORAL 2 TIMES DAILY
Qty: 14 TABLET | Refills: 0 | Status: SHIPPED | OUTPATIENT
Start: 2021-06-14 | End: 2021-06-14

## 2021-06-14 RX ORDER — AMOXICILLIN AND CLAVULANATE POTASSIUM 875; 125 MG/1; MG/1
1 TABLET, FILM COATED ORAL 2 TIMES DAILY
Qty: 14 TABLET | Refills: 0 | Status: SHIPPED | OUTPATIENT
Start: 2021-06-14 | End: 2021-06-14

## 2021-06-14 NOTE — PROGRESS NOTES
HPI    Patient presents for left sided abdominal pain. Went to ER on 6/11 with sharp pain and left without being seen since pain subsided. Today reports that pain returned yesterday but is now resolving again. Reports some abdominal bloating.   Reports t education: Not on file      Highest education level: Not on file    Occupational History      Not on file    Tobacco Use      Smoking status: Never Smoker      Smokeless tobacco: Never Used    Vaping Use      Vaping Use: Never used    Substance and Sexual tablet by mouth 2 (two) times daily for 7 days.  14 tablet 0   • naproxen 500 MG Oral Tab TAKE ONE TABLET BY MOUTH TWICE A DAY WITH MEALS 60 tablet 0   • omeprazole 20 MG Oral Capsule Delayed Release TAKE 1 CAPSULE EVERY MORNING BEFORE BREAKFAST 30 capsule -  Primary    Relevant Medications    Amoxicillin-Pot Clavulanate 875-125 MG Oral Tab    Diverticulosis        Relevant Medications    Amoxicillin-Pot Clavulanate 875-125 MG Oral Tab         Discussed diet and urged to eat healthier.   Augmentin bid x 7 d

## 2021-06-15 NOTE — ED PROVIDER NOTES
Patient Seen in: [facility] AND [facility] Emergency Department      History   Patient presents with:  Abdomen/Flank Pain    Stated Complaint: abd pain     HPI/Subjective:   HPI    14-year-old male presents for evaluation for left-sided abdominal pain beginning 84   Resp 06/14/21 1733 18   Temp 06/14/21 1733 98 °F (36.7 °C)   Temp src --    SpO2 06/14/21 1733 98 %   O2 Device 06/14/21 2105 None (Room air)       Current:BP (!) 147/93   Pulse 68   Temp 98 °F (36.7 °C)   Resp 18   Wt 117.9 kg   SpO2 98%   BMI 40.12 limits   BASIC METABOLIC PANEL (8) - Abnormal; Notable for the following components:    Glucose 121 (*)     All other components within normal limits   CBC W/ DIFFERENTIAL - Abnormal; Notable for the following components:    WBC 13.6 (*)     Neutrophil Abs Use of iterative reconstruction technique for dose reduction was used. Dose information is transmitted to the [facility] [employer] [facility] Radiology) NRDR ([address]) which includes the Dose Index Registry.   FINDINGS:  LIVER: Tiny 5 mm cystic lesion probably a cyst or biliary hamartoma. Dictated by (CST): Ravi Chen MD on 6/14/2021 at 9:16 PM     Finalized by (CST): Ravi Chen MD on 6/14/2021 at 9:22 PM            I personally reviewed all radiology images.     [employer]

## 2021-07-07 DIAGNOSIS — E11.69 DIABETES MELLITUS TYPE 2 IN OBESE: ICD-10-CM

## 2021-07-07 DIAGNOSIS — E66.9 DIABETES MELLITUS TYPE 2 IN OBESE: ICD-10-CM

## 2021-07-08 RX ORDER — NAPROXEN 500 MG/1
TABLET ORAL
Qty: 60 TABLET | Refills: 0 | Status: SHIPPED | OUTPATIENT
Start: 2021-07-08 | End: 2021-12-10

## 2021-07-08 RX ORDER — ATORVASTATIN CALCIUM 20 MG/1
20 TABLET, FILM COATED ORAL NIGHTLY
Qty: 30 TABLET | Refills: 0 | Status: SHIPPED | OUTPATIENT
Start: 2021-07-08 | End: 2021-08-03

## 2021-07-08 RX ORDER — LOSARTAN POTASSIUM 100 MG/1
100 TABLET ORAL DAILY
Qty: 30 TABLET | Refills: 0 | Status: SHIPPED | OUTPATIENT
Start: 2021-07-08 | End: 2021-08-20

## 2021-08-03 RX ORDER — ATORVASTATIN CALCIUM 20 MG/1
20 TABLET, FILM COATED ORAL NIGHTLY
Qty: 30 TABLET | Refills: 0 | Status: SHIPPED | OUTPATIENT
Start: 2021-08-03 | End: 2021-08-20

## 2021-08-20 DIAGNOSIS — E66.9 DIABETES MELLITUS TYPE 2 IN OBESE: ICD-10-CM

## 2021-08-20 DIAGNOSIS — E11.69 DIABETES MELLITUS TYPE 2 IN OBESE: ICD-10-CM

## 2021-08-20 RX ORDER — LOSARTAN POTASSIUM 100 MG/1
100 TABLET ORAL DAILY
Qty: 30 TABLET | Refills: 0 | Status: SHIPPED | OUTPATIENT
Start: 2021-08-20 | End: 2021-11-09

## 2021-08-20 RX ORDER — ATORVASTATIN CALCIUM 20 MG/1
20 TABLET, FILM COATED ORAL NIGHTLY
Qty: 30 TABLET | Refills: 0 | Status: SHIPPED | OUTPATIENT
Start: 2021-08-20 | End: 2022-07-23

## 2021-09-13 ENCOUNTER — TELEPHONE (OUTPATIENT)
Dept: OPTOMETRY | Facility: CLINIC | Age: 46
End: 2021-09-13

## 2021-09-13 NOTE — TELEPHONE ENCOUNTER
Per pt broke glasses and optical shop where he went is now permanently closed, pt was seen 8/27/2020, asking if prescription can be extended. Please advise thank you, states it is ok to reply via mychart.

## 2021-09-25 ENCOUNTER — OFFICE VISIT (OUTPATIENT)
Dept: FAMILY MEDICINE CLINIC | Facility: CLINIC | Age: 46
End: 2021-09-25
Payer: MEDICAID

## 2021-09-25 VITALS
DIASTOLIC BLOOD PRESSURE: 80 MMHG | TEMPERATURE: 99 F | HEIGHT: 67.5 IN | BODY MASS INDEX: 40.33 KG/M2 | WEIGHT: 260 LBS | HEART RATE: 84 BPM | SYSTOLIC BLOOD PRESSURE: 147 MMHG

## 2021-09-25 DIAGNOSIS — L30.9 DERMATITIS: ICD-10-CM

## 2021-09-25 DIAGNOSIS — T78.40XA ALLERGIC REACTION, INITIAL ENCOUNTER: Primary | ICD-10-CM

## 2021-09-25 PROCEDURE — 99214 OFFICE O/P EST MOD 30 MIN: CPT | Performed by: NURSE PRACTITIONER

## 2021-09-25 PROCEDURE — 3077F SYST BP >= 140 MM HG: CPT | Performed by: NURSE PRACTITIONER

## 2021-09-25 PROCEDURE — 3008F BODY MASS INDEX DOCD: CPT | Performed by: NURSE PRACTITIONER

## 2021-09-25 PROCEDURE — 3079F DIAST BP 80-89 MM HG: CPT | Performed by: NURSE PRACTITIONER

## 2021-09-25 RX ORDER — LEVOCETIRIZINE DIHYDROCHLORIDE 5 MG/1
5 TABLET, FILM COATED ORAL EVERY EVENING
Qty: 90 TABLET | Refills: 1 | Status: SHIPPED | OUTPATIENT
Start: 2021-09-25

## 2021-09-25 RX ORDER — DIAPER,BRIEF,INFANT-TODD,DISP
1 EACH MISCELLANEOUS 2 TIMES DAILY PRN
Qty: 30 G | Refills: 2 | Status: SHIPPED | OUTPATIENT
Start: 2021-09-25

## 2021-09-25 RX ORDER — HYDROXYZINE HYDROCHLORIDE 25 MG/1
25 TABLET, FILM COATED ORAL EVERY 8 HOURS PRN
Qty: 30 TABLET | Refills: 2 | Status: SHIPPED | OUTPATIENT
Start: 2021-09-25 | End: 2021-12-10

## 2021-09-25 RX ORDER — PREDNISONE 20 MG/1
TABLET ORAL
Qty: 10 TABLET | Refills: 0 | Status: SHIPPED | OUTPATIENT
Start: 2021-09-25 | End: 2021-11-09 | Stop reason: ALTCHOICE

## 2021-09-25 NOTE — PROGRESS NOTES
HPI     Patient presents for rash ans itching scattered across body that has been present on and off the the past week and a half. With intermittent hives. No known exposure to new foods, soaps, detergents.       Review of Systems   Skin: Positive for Vaping Use: Never used    Substance and Sexual Activity      Alcohol use: Yes        Comment: occasional      Drug use: Not on file      Sexual activity: Not on file    Other Topics      Concerns:         Service: Not Asked        Blood Transfusio file      Unstable Housing in the Last Year: Not on file    Current Outpatient Medications   Medication Sig Dispense Refill   • levocetirizine 5 MG Oral Tab Take 1 tablet (5 mg total) by mouth every evening.  90 tablet 1   • hydrOXYzine 25 MG Oral Tab Take Judgment: Judgment normal.          Assessment and Plan:   Problem List Items Addressed This Visit     None      Visit Diagnoses     Allergic reaction, initial encounter    -  Primary    Relevant Medications    levocetirizine 5 MG Oral Tab    hydrOXYzine 2

## 2021-11-09 ENCOUNTER — OFFICE VISIT (OUTPATIENT)
Dept: FAMILY MEDICINE CLINIC | Facility: CLINIC | Age: 46
End: 2021-11-09
Payer: COMMERCIAL

## 2021-11-09 VITALS
BODY MASS INDEX: 40.64 KG/M2 | HEIGHT: 67.5 IN | SYSTOLIC BLOOD PRESSURE: 148 MMHG | WEIGHT: 262 LBS | HEART RATE: 88 BPM | DIASTOLIC BLOOD PRESSURE: 90 MMHG

## 2021-11-09 DIAGNOSIS — I10 ESSENTIAL HYPERTENSION: ICD-10-CM

## 2021-11-09 DIAGNOSIS — R30.0 DYSURIA: Primary | ICD-10-CM

## 2021-11-09 DIAGNOSIS — E11.69 DIABETES MELLITUS TYPE 2 IN OBESE (HCC): ICD-10-CM

## 2021-11-09 DIAGNOSIS — E66.9 DIABETES MELLITUS TYPE 2 IN OBESE (HCC): ICD-10-CM

## 2021-11-09 PROCEDURE — 3008F BODY MASS INDEX DOCD: CPT | Performed by: PHYSICIAN ASSISTANT

## 2021-11-09 PROCEDURE — 99213 OFFICE O/P EST LOW 20 MIN: CPT | Performed by: PHYSICIAN ASSISTANT

## 2021-11-09 PROCEDURE — 81002 URINALYSIS NONAUTO W/O SCOPE: CPT | Performed by: PHYSICIAN ASSISTANT

## 2021-11-09 PROCEDURE — 3080F DIAST BP >= 90 MM HG: CPT | Performed by: PHYSICIAN ASSISTANT

## 2021-11-09 PROCEDURE — 3077F SYST BP >= 140 MM HG: CPT | Performed by: PHYSICIAN ASSISTANT

## 2021-11-09 RX ORDER — LOSARTAN POTASSIUM 100 MG/1
100 TABLET ORAL DAILY
Qty: 90 TABLET | Refills: 1 | Status: SHIPPED | OUTPATIENT
Start: 2021-11-09 | End: 2021-12-10

## 2021-11-10 NOTE — PROGRESS NOTES
HPI:   HPI   55year-old male is here in the office complaining of penile discharge for the past 4 days. Patient is sexually active and has one partner.   Patient states that his penile is swollen, yellowish to greenish discharge, feels burning sensation wh disorder 2007    per [employer]:  \"Increased C/D ratio, OU\"   • Other ill-defined conditions(799.89) 2007    per [employer]:  \"Old K Scar, OS\"       Past Surgical History:     Past Surgical History:   Procedure Laterality Date   • Lasik  2002   • Lasik  2001 file  Transportation Needs:       Lack of Transportation (Medical): Not on file      Lack of Transportation (Non-Medical):  Not on file  Physical Activity:       Days of Exercise per Week: Not on file      Minutes of Exercise per Session: Not on file  Ashley 40.43 kg/m². Physical Exam:   Physical Exam  Vitals reviewed. Constitutional:       General: He is not in acute distress. Appearance: He is well-developed. HENT:      Head: Normocephalic and atraumatic.       Right Ear: External ear normal.

## 2021-11-11 ENCOUNTER — OFFICE VISIT (OUTPATIENT)
Dept: FAMILY MEDICINE CLINIC | Facility: CLINIC | Age: 46
End: 2021-11-11
Payer: COMMERCIAL

## 2021-11-11 ENCOUNTER — TELEPHONE (OUTPATIENT)
Dept: FAMILY MEDICINE CLINIC | Facility: CLINIC | Age: 46
End: 2021-11-11

## 2021-11-11 VITALS
HEIGHT: 67.5 IN | WEIGHT: 262 LBS | HEART RATE: 85 BPM | BODY MASS INDEX: 40.64 KG/M2 | SYSTOLIC BLOOD PRESSURE: 149 MMHG | DIASTOLIC BLOOD PRESSURE: 95 MMHG

## 2021-11-11 DIAGNOSIS — A54.29: Primary | ICD-10-CM

## 2021-11-11 PROCEDURE — 3008F BODY MASS INDEX DOCD: CPT | Performed by: PHYSICIAN ASSISTANT

## 2021-11-11 PROCEDURE — 99213 OFFICE O/P EST LOW 20 MIN: CPT | Performed by: PHYSICIAN ASSISTANT

## 2021-11-11 PROCEDURE — 3077F SYST BP >= 140 MM HG: CPT | Performed by: PHYSICIAN ASSISTANT

## 2021-11-11 PROCEDURE — 3080F DIAST BP >= 90 MM HG: CPT | Performed by: PHYSICIAN ASSISTANT

## 2021-11-11 PROCEDURE — 96372 THER/PROPH/DIAG INJ SC/IM: CPT | Performed by: PHYSICIAN ASSISTANT

## 2021-11-11 RX ORDER — CEFTRIAXONE 500 MG/1
500 INJECTION, POWDER, FOR SOLUTION INTRAMUSCULAR; INTRAVENOUS ONCE
Status: COMPLETED | OUTPATIENT
Start: 2021-11-11 | End: 2021-11-11

## 2021-11-11 RX ADMIN — CEFTRIAXONE 500 MG: 500 INJECTION, POWDER, FOR SOLUTION INTRAMUSCULAR; INTRAVENOUS at 14:45:00

## 2021-11-11 NOTE — TELEPHONE ENCOUNTER
Tianna Martines from the Lab is calling with a positive lab    Neisseria Gonorrhoeae Amplified RNA   Negative Positive Abnormal       IDPH form started.

## 2021-11-11 NOTE — TELEPHONE ENCOUNTER
STI form has been completed and faxed to [address]. No further action needed. Meri Camacho PA-C   11/11/2021  2:23 PM CST Back to Top        Discussed lab results with patient today. Positive for gonorrhea.  Ceftriaxone 500 mg intramus

## 2021-11-14 NOTE — PROGRESS NOTES
HPI:   HPI   55year-old male is here in the office for lab result. Patient has penile discharge and dysuria. Patient denies of chest pain, SOB, N/V/C/D, fever, dizziness, syncope, abdominal pain. There are no other concerns today.     Medications:     Cur History:   Procedure Laterality Date   • Lasik  2002   • Lasik  2001    Lasik, OU       Family History:     Family History   Problem Relation Age of Onset   • Lipids Mother    • Hypertension Mother    • Diabetes Mother    • Other (Other) Other         No F discharge, ear pain, postnasal drip, rhinorrhea, sinus pressure, sinus pain and sore throat. Respiratory: Negative for cough, chest tightness, shortness of breath and wheezing. Cardiovascular: Negative for chest pain and palpitations.    Gastrointesti Other Relevant Orders    CHLAMYDIA/GONOCOCCUS, JOSE      Discussed lab results with patient. Advise patient to avoid sexual intercourse for 1 week. His partner must get treated. Recheck G/C in 3 months.     Discussed plan of care with pt and pt is in agreeme

## 2021-12-04 ENCOUNTER — OFFICE VISIT (OUTPATIENT)
Dept: ALLERGY | Facility: CLINIC | Age: 46
End: 2021-12-04
Payer: COMMERCIAL

## 2021-12-04 ENCOUNTER — LAB ENCOUNTER (OUTPATIENT)
Dept: LAB | Age: 46
End: 2021-12-04
Attending: ALLERGY & IMMUNOLOGY
Payer: COMMERCIAL

## 2021-12-04 VITALS
HEART RATE: 87 BPM | RESPIRATION RATE: 20 BRPM | DIASTOLIC BLOOD PRESSURE: 102 MMHG | SYSTOLIC BLOOD PRESSURE: 163 MMHG | OXYGEN SATURATION: 97 %

## 2021-12-04 DIAGNOSIS — Z91.09 ENVIRONMENTAL ALLERGIES: ICD-10-CM

## 2021-12-04 DIAGNOSIS — Z91.018 FOOD ALLERGY: ICD-10-CM

## 2021-12-04 DIAGNOSIS — Z23 FLU VACCINE NEED: Primary | ICD-10-CM

## 2021-12-04 DIAGNOSIS — L50.1 CHRONIC IDIOPATHIC URTICARIA: ICD-10-CM

## 2021-12-04 DIAGNOSIS — L50.3 DERMATOGRAPHIC URTICARIA: ICD-10-CM

## 2021-12-04 DIAGNOSIS — Z28.09: ICD-10-CM

## 2021-12-04 PROCEDURE — 86003 ALLG SPEC IGE CRUDE XTRC EA: CPT

## 2021-12-04 PROCEDURE — 36415 COLL VENOUS BLD VENIPUNCTURE: CPT

## 2021-12-04 PROCEDURE — 3077F SYST BP >= 140 MM HG: CPT | Performed by: ALLERGY & IMMUNOLOGY

## 2021-12-04 PROCEDURE — 82785 ASSAY OF IGE: CPT | Performed by: ALLERGY & IMMUNOLOGY

## 2021-12-04 PROCEDURE — 3080F DIAST BP >= 90 MM HG: CPT | Performed by: ALLERGY & IMMUNOLOGY

## 2021-12-04 PROCEDURE — 84443 ASSAY THYROID STIM HORMONE: CPT

## 2021-12-04 PROCEDURE — 99244 OFF/OP CNSLTJ NEW/EST MOD 40: CPT | Performed by: ALLERGY & IMMUNOLOGY

## 2021-12-04 PROCEDURE — 86003 ALLG SPEC IGE CRUDE XTRC EA: CPT | Performed by: ALLERGY & IMMUNOLOGY

## 2021-12-04 RX ORDER — LEVOCETIRIZINE DIHYDROCHLORIDE 5 MG/1
5 TABLET, FILM COATED ORAL EVERY 12 HOURS PRN
Qty: 180 TABLET | Refills: 1 | Status: SHIPPED | OUTPATIENT
Start: 2021-12-04

## 2021-12-04 NOTE — PROGRESS NOTES
Temo Vences is a 55year old male. HPI:   Patient presents with:  Rash: Pt reports rashes for last 2 and a half months due to unknown cause. Has been taking Xyzal and reports it is very itchy in nature.  Reports this past week has been worse in natur Lasik, OU      Family History   Problem Relation Age of Onset   • Lipids Mother    • Hypertension Mother    • Diabetes Mother    • Other (Other) Other         No Family retina history   • Glaucoma Neg         No Family h/o Glaucoma   • Macular degeneration polyphagia  ENMT:  Negative for ear drainage, hearing loss and nasal drainage  Eyes:  Negative for eye discharge and vision loss  Gastrointestinal:  Negative for abdominal pain, diarrhea and vomiting  Genitourinary:  Negative for dysuria and hematuria  Hem reviewed including this being a physical form of hives. Reviewed other physical triggers including scratching heat cold pressure sunlight sweating and vibration  Reviewed mainstay of treatment is symptomatic treatment with antihistamines.  Recommend increas effects as well as potential efficacy. Patient's questions were answered in regards to medication administration and dosing and potential side effects.  Teaching was provided via the teach back method

## 2021-12-04 NOTE — PATIENT INSTRUCTIONS
1. Chronic urticaria with dermatographia component  Almost 3-month history of urticaria. Intermittent in nature. Dermatographia component. No associated oral swelling or respiratory issues.     Handouts on dermatographia provided and reviewed including this

## 2021-12-06 ENCOUNTER — TELEPHONE (OUTPATIENT)
Dept: ALLERGY | Facility: CLINIC | Age: 46
End: 2021-12-06

## 2021-12-06 NOTE — TELEPHONE ENCOUNTER
Tried leaving a message for patient regarding test results- voicemail full      ----- Message from Abdirashid Juarez MD sent at 12/6/2021  4:42 PM CST -----  Please call patient with recent serum allergy profile to common food allergens.   Patient did show I

## 2021-12-07 NOTE — TELEPHONE ENCOUNTER
Spoke with patient. Verified name and . Informed patient of test results and recommendations per Dr. John Jackson ( see Dr. Margie Portillo message below). Patient verbalizes understanding, no further questions at this time.

## 2021-12-10 DIAGNOSIS — E66.9 DIABETES MELLITUS TYPE 2 IN OBESE (HCC): ICD-10-CM

## 2021-12-10 DIAGNOSIS — E11.69 DIABETES MELLITUS TYPE 2 IN OBESE (HCC): ICD-10-CM

## 2021-12-10 DIAGNOSIS — T78.40XA ALLERGIC REACTION, INITIAL ENCOUNTER: ICD-10-CM

## 2021-12-12 RX ORDER — LOSARTAN POTASSIUM 100 MG/1
100 TABLET ORAL DAILY
Qty: 90 TABLET | Refills: 1 | Status: SHIPPED | OUTPATIENT
Start: 2021-12-12 | End: 2022-03-12

## 2021-12-12 RX ORDER — NAPROXEN 500 MG/1
TABLET ORAL
Qty: 60 TABLET | Refills: 0 | Status: SHIPPED | OUTPATIENT
Start: 2021-12-12

## 2021-12-12 RX ORDER — ATORVASTATIN CALCIUM 20 MG/1
20 TABLET, FILM COATED ORAL NIGHTLY
Qty: 30 TABLET | Refills: 0 | OUTPATIENT
Start: 2021-12-12

## 2021-12-13 RX ORDER — LEVOCETIRIZINE DIHYDROCHLORIDE 5 MG/1
5 TABLET, FILM COATED ORAL EVERY EVENING
Qty: 90 TABLET | Refills: 1 | OUTPATIENT
Start: 2021-12-13

## 2021-12-13 RX ORDER — HYDROXYZINE HYDROCHLORIDE 25 MG/1
25 TABLET, FILM COATED ORAL EVERY 8 HOURS PRN
Qty: 30 TABLET | Refills: 2 | Status: SHIPPED | OUTPATIENT
Start: 2021-12-13 | End: 2023-06-15

## 2021-12-18 RX ORDER — ATORVASTATIN CALCIUM 20 MG/1
20 TABLET, FILM COATED ORAL NIGHTLY
Qty: 30 TABLET | Refills: 0 | OUTPATIENT
Start: 2021-12-18

## 2022-01-20 NOTE — PATIENT INSTRUCTIONS
Understanding Acute Respiratory Distress Syndrome (ARDS)   Acute respiratory distress syndrome (ARDS) is a severe lung condition. It most often happens a few days after a serious illness or injury. Most people with ARDS are already in hospital care.  AR check your blood oxygen level and look for signs of infection. · Chest X-ray. This test can show fluid in the lungs. · Echocardiogram. This imaging test looks at the heart as it beats. It’s done to check for signs of heart failure. · Sputum culture.  [place] of your condition. They will let you and your family know the treatments being used or considered. They can answer your questions and concerns.   When you start to recover, you will be weaned off the ventilator.  This means less air will be used and your ramana

## 2022-01-20 NOTE — PROGRESS NOTES
Blood pressure 134/74, pulse 85, height 5' 7.5\" (1.715 m), weight 247 lb (112 kg). Presents today complaining of a grief reaction. Was in a long relationship with a woman who  2 weeks ago. He reports he is not suicidal does not use drugs.   He louise

## 2022-02-05 ENCOUNTER — NURSE TRIAGE (OUTPATIENT)
Dept: FAMILY MEDICINE CLINIC | Facility: CLINIC | Age: 47
End: 2022-02-05

## 2022-02-05 RX ORDER — ESCITALOPRAM OXALATE 10 MG/1
10 TABLET ORAL DAILY
Qty: 30 TABLET | Refills: 2 | Status: SHIPPED | OUTPATIENT
Start: 2022-02-05 | End: 2022-03-05

## 2022-03-05 NOTE — PROGRESS NOTES
Blood pressure 122/81, pulse 69, height 5' 7.5\" (1.715 m), weight 238 lb (108 kg). Patient presents today following up for grief reaction currently seeing a counselor. He is on a leave from work under advisement from his counselor. He denies suicidal ideation or drug use. He does have some difficulty sleeping using melatonin. He did not tolerate the Lexapro. He had GI upset from the medication. Occasional palpitations when he becomes anxious.     Objective patient with a relatively flat affect otherwise appropriate  Heart regular rate rhythm no murmurs      Assessment grief reaction    Plan fluoxetine prescription risks and benefits explained    Fasting blood test order printed out given to patient    Follow-up in 3 weeks

## 2022-05-15 NOTE — TELEPHONE ENCOUNTER
Refill passed per [place] [facility] protocol. Unable to refill due to high drug warning   Requested Prescriptions   Pending Prescriptions Disp Refills    FLUoxetine 10 MG Oral Tab 30 tablet 2     Sig: Take 1 tablet (10 mg total) by mouth daily.         Psychiatric Non-Scheduled (Anti-Anxiety) Passed - 5/14/2022 12:45 PM        Passed - Appointment in last 6 or next 3 months              Recent Outpatient Visits              2 months ago Grief reaction    [facility], [address], DO    Office Visit    3 months ago Grief reaction    [facility], [address], DO    Office Visit    5 months ago Flu vaccine need    [address] Claire Castellanos MD    Office Visit    6 months ago Infection of upper genitourinary tract due to [address] PA-C    Office Visit    6 months [facility] [place]    Office Visit

## 2022-05-16 RX ORDER — FLUOXETINE 10 MG/1
10 TABLET, FILM COATED ORAL DAILY
Qty: 90 TABLET | Refills: 3 | Status: SHIPPED | OUTPATIENT
Start: 2022-05-16 | End: 2022-07-23

## 2022-06-02 RX ORDER — LEVOCETIRIZINE DIHYDROCHLORIDE 5 MG/1
5 TABLET, FILM COATED ORAL EVERY 12 HOURS PRN
Qty: 180 TABLET | Refills: 0 | Status: SHIPPED | OUTPATIENT
Start: 2022-06-02

## 2022-06-23 RX ORDER — FLUOXETINE 10 MG/1
10 TABLET, FILM COATED ORAL DAILY
Qty: 90 TABLET | Refills: 3 | OUTPATIENT
Start: 2022-06-23

## 2022-06-24 RX ORDER — NAPROXEN 500 MG/1
TABLET ORAL
Qty: 60 TABLET | Refills: 0 | Status: SHIPPED | OUTPATIENT
Start: 2022-06-24

## 2022-07-25 RX ORDER — ATORVASTATIN CALCIUM 20 MG/1
20 TABLET, FILM COATED ORAL NIGHTLY
Qty: 30 TABLET | Refills: 0 | Status: SHIPPED | OUTPATIENT
Start: 2022-07-25 | End: 2022-08-18

## 2022-07-25 RX ORDER — FLUOXETINE 10 MG/1
10 TABLET, FILM COATED ORAL DAILY
Qty: 90 TABLET | Refills: 3 | Status: SHIPPED | OUTPATIENT
Start: 2022-07-25 | End: 2022-07-26

## 2022-07-26 ENCOUNTER — OFFICE VISIT (OUTPATIENT)
Dept: FAMILY MEDICINE CLINIC | Facility: CLINIC | Age: 47
End: 2022-07-26
Payer: COMMERCIAL

## 2022-07-26 VITALS
DIASTOLIC BLOOD PRESSURE: 72 MMHG | HEART RATE: 80 BPM | WEIGHT: 228 LBS | BODY MASS INDEX: 35.37 KG/M2 | SYSTOLIC BLOOD PRESSURE: 119 MMHG | HEIGHT: 67.5 IN

## 2022-07-26 DIAGNOSIS — G47.33 OSA (OBSTRUCTIVE SLEEP APNEA): Primary | ICD-10-CM

## 2022-07-26 DIAGNOSIS — Z12.11 ENCOUNTER FOR SCREENING COLONOSCOPY: ICD-10-CM

## 2022-07-26 PROBLEM — E66.01 MORBID (SEVERE) OBESITY DUE TO EXCESS CALORIES (HCC): Status: ACTIVE | Noted: 2022-07-26

## 2022-07-26 PROCEDURE — 3074F SYST BP LT 130 MM HG: CPT | Performed by: FAMILY MEDICINE

## 2022-07-26 PROCEDURE — 99214 OFFICE O/P EST MOD 30 MIN: CPT | Performed by: FAMILY MEDICINE

## 2022-07-26 PROCEDURE — 3078F DIAST BP <80 MM HG: CPT | Performed by: FAMILY MEDICINE

## 2022-07-26 PROCEDURE — 3008F BODY MASS INDEX DOCD: CPT | Performed by: FAMILY MEDICINE

## 2022-07-26 RX ORDER — FLUOXETINE 20 MG/1
20 TABLET, FILM COATED ORAL DAILY
Qty: 30 TABLET | Refills: 2 | Status: SHIPPED | OUTPATIENT
Start: 2022-07-26

## 2022-07-26 RX ORDER — LOSARTAN POTASSIUM 100 MG/1
100 TABLET ORAL DAILY
Qty: 90 TABLET | Refills: 3 | Status: SHIPPED | OUTPATIENT
Start: 2022-07-26

## 2022-07-26 RX ORDER — LOSARTAN POTASSIUM 100 MG/1
100 TABLET ORAL DAILY
COMMUNITY
Start: 2022-04-19 | End: 2022-07-26

## 2022-07-26 RX ORDER — DIAPER,BRIEF,INFANT-TODD,DISP
1 EACH MISCELLANEOUS 2 TIMES DAILY PRN
COMMUNITY
Start: 2021-09-25 | End: 2022-07-26

## 2022-07-26 NOTE — PROGRESS NOTES
Blood pressure 119/72, pulse 80, height 5' 7.5\" (1.715 m), weight 228 lb (103.4 kg). Patient presents today following up for grief reaction. Reports she has not had a panic attack for some time. Has had difficulty sleeping but slept well last night. Denies any drug use or suicidal ideation. He has not been working. He has had difficulty getting out of bed. He feels that the fluoxetine has improved him and is interested in increasing the dose. He denies feeling hyperactive or staying awake for days on end. He does spend a lot with his credit card. He reports he had lost more weight previously has gained some back. He does go for walks in the morning.     Objective patient with somewhat blunted affect otherwise appropriate    Assessment #1 grief reaction #2 insomnia #3 type 2 diabetes #4 hypertension #5 history of sleep apnea    Plan #1 discussed group support therapy for grief also increased fluoxetine #2 insomnia information given #3 fasting blood test ordered #4 refill losartan #5 follow-up with pulmonary    Follow-up with me in 1 month

## 2022-08-18 ENCOUNTER — LAB ENCOUNTER (OUTPATIENT)
Dept: LAB | Age: 47
End: 2022-08-18
Attending: FAMILY MEDICINE
Payer: COMMERCIAL

## 2022-08-18 DIAGNOSIS — R73.9 HYPERGLYCEMIA: ICD-10-CM

## 2022-08-18 DIAGNOSIS — A54.29: ICD-10-CM

## 2022-08-18 DIAGNOSIS — T78.40XA ALLERGIC REACTION, INITIAL ENCOUNTER: ICD-10-CM

## 2022-08-18 DIAGNOSIS — F43.21 GRIEF REACTION: ICD-10-CM

## 2022-08-18 LAB
ALBUMIN SERPL-MCNC: 3.6 G/DL (ref 3.4–5)
ALBUMIN/GLOB SERPL: 0.9 {RATIO} (ref 1–2)
ALP LIVER SERPL-CCNC: 113 U/L
ALT SERPL-CCNC: 29 U/L
ANION GAP SERPL CALC-SCNC: 6 MMOL/L (ref 0–18)
AST SERPL-CCNC: 8 U/L (ref 15–37)
BILIRUB SERPL-MCNC: 0.9 MG/DL (ref 0.1–2)
BUN BLD-MCNC: 12 MG/DL (ref 7–18)
BUN/CREAT SERPL: 15.4 (ref 10–20)
C TRACH DNA SPEC QL NAA+PROBE: NEGATIVE
CALCIUM BLD-MCNC: 8.7 MG/DL (ref 8.5–10.1)
CHLORIDE SERPL-SCNC: 106 MMOL/L (ref 98–112)
CHOLEST SERPL-MCNC: 171 MG/DL (ref ?–200)
CO2 SERPL-SCNC: 26 MMOL/L (ref 21–32)
COMPLEXED PSA SERPL-MCNC: 0.6 NG/ML (ref ?–4)
CREAT BLD-MCNC: 0.78 MG/DL
CREAT UR-SCNC: 96.7 MG/DL
EST. AVERAGE GLUCOSE BLD GHB EST-MCNC: 123 MG/DL (ref 68–126)
FASTING PATIENT LIPID ANSWER: YES
FASTING STATUS PATIENT QL REPORTED: YES
GFR SERPLBLD BASED ON 1.73 SQ M-ARVRAT: 111 ML/MIN/1.73M2 (ref 60–?)
GLOBULIN PLAS-MCNC: 4.2 G/DL (ref 2.8–4.4)
GLUCOSE BLD-MCNC: 114 MG/DL (ref 70–99)
HBA1C MFR BLD: 5.9 % (ref ?–5.7)
HDLC SERPL-MCNC: 40 MG/DL (ref 40–59)
LDLC SERPL CALC-MCNC: 110 MG/DL (ref ?–100)
MICROALBUMIN UR-MCNC: 0.83 MG/DL
MICROALBUMIN/CREAT 24H UR-RTO: 8.6 UG/MG (ref ?–30)
N GONORRHOEA DNA SPEC QL NAA+PROBE: NEGATIVE
NONHDLC SERPL-MCNC: 131 MG/DL (ref ?–130)
OSMOLALITY SERPL CALC.SUM OF ELEC: 287 MOSM/KG (ref 275–295)
POTASSIUM SERPL-SCNC: 4.3 MMOL/L (ref 3.5–5.1)
PROT SERPL-MCNC: 7.8 G/DL (ref 6.4–8.2)
SODIUM SERPL-SCNC: 138 MMOL/L (ref 136–145)
TRIGL SERPL-MCNC: 116 MG/DL (ref 30–149)
TSI SER-ACNC: 2.59 MIU/ML (ref 0.36–3.74)
VLDLC SERPL CALC-MCNC: 20 MG/DL (ref 0–30)

## 2022-08-18 PROCEDURE — 86003 ALLG SPEC IGE CRUDE XTRC EA: CPT

## 2022-08-18 PROCEDURE — 82785 ASSAY OF IGE: CPT

## 2022-08-18 PROCEDURE — 83036 HEMOGLOBIN GLYCOSYLATED A1C: CPT

## 2022-08-18 PROCEDURE — 80053 COMPREHEN METABOLIC PANEL: CPT

## 2022-08-18 PROCEDURE — 87491 CHLMYD TRACH DNA AMP PROBE: CPT

## 2022-08-18 PROCEDURE — 3061F NEG MICROALBUMINURIA REV: CPT | Performed by: FAMILY MEDICINE

## 2022-08-18 PROCEDURE — 82043 UR ALBUMIN QUANTITATIVE: CPT

## 2022-08-18 PROCEDURE — 80061 LIPID PANEL: CPT

## 2022-08-18 PROCEDURE — 87591 N.GONORRHOEAE DNA AMP PROB: CPT

## 2022-08-18 PROCEDURE — 36415 COLL VENOUS BLD VENIPUNCTURE: CPT

## 2022-08-18 PROCEDURE — 3044F HG A1C LEVEL LT 7.0%: CPT | Performed by: FAMILY MEDICINE

## 2022-08-18 PROCEDURE — 84443 ASSAY THYROID STIM HORMONE: CPT

## 2022-08-18 PROCEDURE — 82570 ASSAY OF URINE CREATININE: CPT

## 2022-08-18 RX ORDER — ATORVASTATIN CALCIUM 20 MG/1
20 TABLET, FILM COATED ORAL NIGHTLY
Qty: 90 TABLET | Refills: 3 | Status: SHIPPED | OUTPATIENT
Start: 2022-08-18 | End: 2023-06-15

## 2022-08-22 LAB
A ALTERNATA IGE QN: <0.1 KUA/L (ref ?–0.1)
C HERBARUM IGE QN: <0.1 KUA/L (ref ?–0.1)
CAT DANDER IGE QN: <0.1 KUA/L (ref ?–0.1)
CLAM IGE QN: <0.1 KUA/L (ref ?–0.1)
CODFISH IGE QN: <0.1 KUA/L (ref ?–0.1)
COMMON RAGWEED IGE QN: 0.21 KUA/L (ref ?–0.1)
CORN IGE QN: <0.1 KUA/L (ref ?–0.1)
COW MILK IGE QN: <0.1 KUA/L (ref ?–0.1)
D FARINAE IGE QN: 2.29 KUA/L (ref ?–0.1)
DOG DANDER IGE QN: <0.1 KUA/L (ref ?–0.1)
EGG WHITE IGE QN: <0.1 KUA/L (ref ?–0.1)
GOOSEFOOT IGE QN: 0.11 KUA/L (ref ?–0.1)
HOUSE DUST HS IGE QN: 0.42 KUA/L (ref ?–0.1)
IGE SERPL-ACNC: 214 KU/L (ref 2–214)
IGE SERPL-ACNC: 214 KU/L (ref 2–214)
KENT BLUE GRASS IGE QN: <0.1 KUA/L (ref ?–0.1)
PEANUT IGE QN: <0.1 KUA/L (ref ?–0.1)
PER RYE GRASS IGE QN: <0.1 KUA/L (ref ?–0.1)
SCALLOP IGE QN: <0.1 KUA/L (ref ?–0.1)
SESAME SEED IGE QN: <0.1 KUA/L (ref ?–0.1)
SHRIMP IGE QN: 1.61 KUA/L (ref ?–0.1)
SOYBEAN IGE QN: <0.1 KUA/L (ref ?–0.1)
WALNUT IGE QN: <0.1 KUA/L (ref ?–0.1)
WHEAT IGE QN: <0.1 KUA/L (ref ?–0.1)
WHITE ELM IGE QN: <0.1 KUA/L (ref ?–0.1)
WHITE OAK IGE QN: <0.1 KUA/L (ref ?–0.1)

## 2022-08-23 NOTE — TELEPHONE ENCOUNTER
Refill passed per [address] [place] [place].      Last GFR 8/18/22    Requested Prescriptions   Pending Prescriptions Disp Refills    metFORMIN 500 MG Oral Tab  0     Sig: Take 1 tablet (500 mg total) by mouth once daily with breakfast for diabetes        Diabetes Medication Protocol Failed - 8/23/2022 12:10 PM        Failed - GFR in the past 12 months        Passed - Last A1C < 7.5 and within past 6 months     Lab Results   Component Value Date    A1C 5.9 (H) 08/18/2022               Passed - In person appointment or virtual visit in the past 6 mos or appointment in next 3 mos       Recent Outpatient Visits              4 weeks ago JULIUS (obstructive sleep apnea)    [facility], [address], DO    Office Visit    5 months ago Grief reaction    [facility], [address], DO    Office Visit    7 months ago Grief reaction    [facility], [address], DO    Office Visit    8 months ago Flu vaccine need    [facility], Emelyn Vital MD    Office Visit    9 months ago Infection of upper genitourinary tract due to [address] Emilia Cr[place], [place]    Office Visit     Future Appointments         Provider Department Appt Notes    In 3 days Jenny Bloom, [address] [facility] Medicine Test results    In 3 days GI COLON SCREENING [address] GI PROCEDURE telephone [phone]               Passed - GFR > 50     Lab Results   Component Value Date    [facility] 111 08/18/2022                        Recent Outpatient Visits              4 weeks ago JULIUS (obstructive sleep apnea)    [facility], [address], DO    Office Visit    5 months ago Grief reaction    [facility], [address], DO    Office Visit    7 months ago Grief reaction    [facility] Stephani Mackay DO    Office Visit    8 months ago Flu vaccine need    [place] [address], [place], Jd Ordaz MD    Office Visit    9 months ago Infection of upper genitourinary tract due to [address] [place]    Office Visit             Future Appointments         Provider Department Appt Notes    In 3 days [place] [address] Family Medicine Test results    In 3 days GI COLON SCREENING [address] GI PROCEDURE telephone [phone]

## 2022-08-26 ENCOUNTER — NURSE ONLY (OUTPATIENT)
Dept: GASTROENTEROLOGY | Facility: CLINIC | Age: 47
End: 2022-08-26
Payer: COMMERCIAL

## 2022-08-26 ENCOUNTER — OFFICE VISIT (OUTPATIENT)
Dept: FAMILY MEDICINE CLINIC | Facility: CLINIC | Age: 47
End: 2022-08-26
Payer: COMMERCIAL

## 2022-08-26 VITALS
DIASTOLIC BLOOD PRESSURE: 83 MMHG | HEART RATE: 88 BPM | SYSTOLIC BLOOD PRESSURE: 134 MMHG | WEIGHT: 234 LBS | HEIGHT: 67.5 IN | BODY MASS INDEX: 36.3 KG/M2

## 2022-08-26 DIAGNOSIS — Z12.11 COLON CANCER SCREENING: Primary | ICD-10-CM

## 2022-08-26 DIAGNOSIS — Z12.11 ENCOUNTER FOR SCREENING COLONOSCOPY: Primary | ICD-10-CM

## 2022-08-26 PROCEDURE — 3075F SYST BP GE 130 - 139MM HG: CPT | Performed by: FAMILY MEDICINE

## 2022-08-26 PROCEDURE — 99214 OFFICE O/P EST MOD 30 MIN: CPT | Performed by: FAMILY MEDICINE

## 2022-08-26 PROCEDURE — 3008F BODY MASS INDEX DOCD: CPT | Performed by: FAMILY MEDICINE

## 2022-08-26 PROCEDURE — 3079F DIAST BP 80-89 MM HG: CPT | Performed by: FAMILY MEDICINE

## 2022-08-26 RX ORDER — FLUOXETINE 10 MG/1
10 TABLET, FILM COATED ORAL DAILY
COMMUNITY
Start: 2022-08-18 | End: 2022-08-26

## 2022-08-26 RX ORDER — FLUOXETINE HYDROCHLORIDE 60 MG/1
TABLET, FILM COATED ORAL; ORAL
Qty: 45 TABLET | Refills: 1 | Status: SHIPPED | OUTPATIENT
Start: 2022-08-26

## 2022-08-26 RX ORDER — EPINEPHRINE 0.3 MG/.3ML
0.3 INJECTION SUBCUTANEOUS ONCE
Qty: 1 EACH | Refills: 1 | Status: SHIPPED | OUTPATIENT
Start: 2022-08-26 | End: 2022-08-26

## 2022-08-26 NOTE — PROGRESS NOTES
Blood pressure 134/83, pulse 88, height 5' 7.5\" (1.715 m), weight 234 lb (106.1 kg). Following up for prolonged grief reaction. Still not back to work. Therapist will complete forms for him to return to work. No drug use no suicidal ideation. Patient keeps busy by doing housework. No exercise. Weight loss noted. He is requesting an increase in medication. No recent panic attacks. Sleeps well using melatonin.     Objective patient with appropriate mood and affect    Hemoglobin A1c 5.9    Assessment #1 prolonged grief reaction #2 prediabetes #3 hyperlipidemia #4 allergies noted with severe shrimp allergy    Plan #1 increased fluoxetine #2 encourage exercise and continued weight loss dietary information given #3 weight loss #4 EpiPen refilled    Follow-up in 1 month

## 2022-08-26 NOTE — PROGRESS NOTES
Dr. Bárbara Can,     Called patient for a scheduled telephone colon screening. GI MD preference: none  Insurance:  [facility] from: 6/14/2021  PCP visit on: 8/26/22    First colonoscopy: yes     Anticoagulants: no   Diabetic Meds: metformin  BP meds(Ace inhibitors/ARB's): losartan  Weight loss meds (phentermine/vyvanse): no   Iron supplement (RX/OTC): no   Height & Weight/BMI: 5'7\"/234/36  Hx of Cardiac/CVA issues/(MI/Stroke): no   Devices Pacemaker/Defibrillator/Stents: no   Resp. Issues/Oxygen Use/MARK/COPD: mark no cpap  Issues w/Anesthesia: no     Symptoms (Y/N): no     Family history of colon cancer: no     Medications, pharmacy, and allergies verified with patient over the phone. Please advise on orders and prep, thank you.

## 2022-08-30 RX ORDER — SODIUM, POTASSIUM,MAG SULFATES 17.5-3.13G
SOLUTION, RECONSTITUTED, ORAL ORAL
Qty: 1 EACH | Refills: 0 | Status: SHIPPED | OUTPATIENT
Start: 2022-08-30

## 2022-08-30 NOTE — PROGRESS NOTES
Called patient to help assist with scheduling procedure. Patient states he has an emergency and will be going out of town with no expected return date. Would like to hold off on scheduling his colonoscopy at this time. Informed patient colonoscopy orders are void for 1 year and instructed him to contact the GI clinic when available to schedule procedure. Patient appreciative and voiced understanding. TE closed.

## 2022-08-30 NOTE — PROGRESS NOTES
Dx: average risk crc screening  Colonoscopy with MAC sedation  Split dose suprep, if suprep not covered then golytely or moviprep, sent to pharmacy  Please review prep instructions with patient    - HOLD ACE/ARBs the night before and/or the day of the procedure(s) - Hold losartan  - NO herbal supplements or weight loss medications x 7 days prior to the procedure(s)

## 2022-09-02 DIAGNOSIS — T78.40XA ALLERGIC REACTION, INITIAL ENCOUNTER: ICD-10-CM

## 2022-09-02 RX ORDER — LEVOCETIRIZINE DIHYDROCHLORIDE 5 MG/1
TABLET, FILM COATED ORAL
Qty: 180 TABLET | Refills: 0 | Status: SHIPPED | OUTPATIENT
Start: 2022-09-02

## 2023-01-08 DIAGNOSIS — T78.40XA ALLERGIC REACTION, INITIAL ENCOUNTER: ICD-10-CM

## 2023-01-09 RX ORDER — LEVOCETIRIZINE DIHYDROCHLORIDE 5 MG/1
5 TABLET, FILM COATED ORAL EVERY 12 HOURS PRN
Qty: 180 TABLET | Refills: 0 | Status: SHIPPED | OUTPATIENT
Start: 2023-01-09

## 2023-04-06 DIAGNOSIS — T78.40XA ALLERGIC REACTION, INITIAL ENCOUNTER: ICD-10-CM

## 2023-04-06 RX ORDER — LEVOCETIRIZINE DIHYDROCHLORIDE 5 MG/1
5 TABLET, FILM COATED ORAL EVERY 12 HOURS PRN
Qty: 180 TABLET | Refills: 0 | OUTPATIENT
Start: 2023-04-06

## 2023-04-06 NOTE — TELEPHONE ENCOUNTER
Pt last seen on 12/4/2021. Pt overdue for follow up. Refills denied until pt is seen. RN sent [email or web address] message to pt to notify him that he is overdue and will need a follow up for further refills. Provided call back number to schedule.

## 2023-06-15 DIAGNOSIS — Z12.11 ENCOUNTER FOR SCREENING COLONOSCOPY: ICD-10-CM

## 2023-06-15 DIAGNOSIS — T78.40XA ALLERGIC REACTION, INITIAL ENCOUNTER: ICD-10-CM

## 2023-06-15 RX ORDER — LOSARTAN POTASSIUM 100 MG/1
100 TABLET ORAL DAILY
Qty: 90 TABLET | Refills: 0 | Status: SHIPPED | OUTPATIENT
Start: 2023-06-15

## 2023-06-15 RX ORDER — FLUOXETINE HYDROCHLORIDE 60 MG/1
TABLET, FILM COATED ORAL; ORAL
Qty: 45 TABLET | Refills: 0 | Status: SHIPPED | OUTPATIENT
Start: 2023-06-15

## 2023-06-15 RX ORDER — LEVOCETIRIZINE DIHYDROCHLORIDE 5 MG/1
5 TABLET, FILM COATED ORAL EVERY 12 HOURS PRN
Qty: 180 TABLET | Refills: 0 | OUTPATIENT
Start: 2023-06-15

## 2023-06-15 RX ORDER — ATORVASTATIN CALCIUM 20 MG/1
20 TABLET, FILM COATED ORAL NIGHTLY
Qty: 90 TABLET | Refills: 0 | Status: SHIPPED | OUTPATIENT
Start: 2023-06-15

## 2023-06-15 NOTE — TELEPHONE ENCOUNTER
Need appt for further refills   Please review. Protocol failed/ No protocol      Requested Prescriptions   Pending Prescriptions Disp Refills    losartan 100 MG Oral Tab 90 tablet 3     Sig: Take 1 tablet (100 mg total) by mouth daily. Hypertensive Medications Protocol Failed - 6/15/2023  8:33 AM        Failed - CMP or BMP in past 6 months     No results found for this or any previous visit (from the past 4392 hour(s)).             Failed - In person appointment or virtual visit in the past 6 months     Recent Outpatient Visits              9 months ago Encounter for screening colonoscopy    [facility], [address], [place], Evelinscille Raven, DO    Office Visit    9 months ago Colon cancer screening    [address] [facility],[address], [address] [facility],[address], [place]    Nurse Only    10 months ago JULIUS (obstructive sleep apnea)    [facility], [address], [place], Priscille Raven, DO    Office Visit    1 year ago Grief reaction    [facility], [place], Priscille Raven, DO    Office Visit    1 year ago Grief reaction    [facility], [place], Priscille Raven, DO    Office Visit                      Passed - In person appointment in the past 12 or next 3 months     Recent Outpatient Visits              9 months ago Encounter for screening colonoscopy    [facility], [place], Priscille Raven, DO    Office Visit    9 months ago Colon cancer screening    [address] [place],[address], [address] [facility],[address], [place]    Nurse Only    10 months ago JULIUS (obstructive sleep apnea)    [facility], [place], Priscille Raven, DO    Office Visit    1 year ago Grief reaction    [address] [facility], [place], Priscille Raven, DO    Office Visit    1 year ago Grief reaction    [address] [facility], [facility], Priscille Raven, DO    Office Visit Passed - Last BP reading less than 140/90     BP Readings from Last 1 Encounters:  08/26/22 : 134/83              Passed - EGFRCR or GFRNAA > 50     GFR Evaluation  EGFRCR: 111 , resulted on 8/18/2022            atorvastatin 20 MG Oral Tab 90 tablet 3     Sig: Take 1 tablet (20 mg total) by mouth nightly.        Cholesterol Medication Protocol Passed - 6/15/2023  8:33 AM        Passed - ALT in past 12 months        Passed - LDL in past 12 months        Passed - Last ALT < 80     Lab Results   Component Value Date    ALT 29 08/18/2022             Passed - Last LDL < 130     Lab Results   Component Value Date     (H) 08/18/2022             Passed - In person appointment or virtual visit in the past 12 mos or appointment in next 3 mos     Recent Outpatient Visits              9 months ago Encounter for screening colonoscopy    [facility], [address], [place], Leighann Marcos, DO    Office Visit    9 months ago Colon cancer screening    [address], [address], [place]    Nurse Only    10 months ago JULIUS (obstructive sleep apnea)    [facility], [address], [place], Leighann Marocs, DO    Office Visit    1 year ago Grief reaction    [address], [address], [place], Leighann Marcos, DO    Office Visit    1 year ago Grief reaction    [facility], [address], [place], Leighann Marcos, DO    Office Visit                        metFORMIN 500 MG Oral Tab 90 tablet 1     Sig: Take 1 tablet (500 mg total) by mouth once daily with breakfast for diabetes       Diabetes Medication Protocol Failed - 6/15/2023  8:33 AM        Failed - Last A1C < 7.5 and within past 6 months     Lab Results   Component Value Date    A1C 5.9 (H) 08/18/2022             Failed - In person appointment or virtual visit in the past 6 mos or appointment in next 3 mos     Recent Outpatient Visits              9 months ago Encounter for screening colonoscopy    [facility], [address], [place], DO    Office Visit    9 months ago Colon cancer screening    [address], [address], [place]    Nurse Only    10 months ago JULIUS (obstructive sleep apnea)    [facility] [address], [place], Abhijit Mims, DO    Office Visit    1 year ago Grief reaction    [facility], [address], [place], Abhijit Mims, DO    Office Visit    1 year ago Grief reaction    [facility] [address], [place], Abhijit Mims, DO    Office Visit                      Passed - EGFRCR or GFRNAA > 50     GFR Evaluation  EGFRCR: 111 , resulted on 8/18/2022          Passed - GFR in the past 12 months          FLUoxetine HCl 60 MG Oral Tab 45 tablet 1     Sig: TAKE 1/2 TABLET BY MOUTH DAILY       Psychiatric Non-Scheduled (Anti-Anxiety) Failed - 6/15/2023  8:33 AM        Failed - In person appointment or virtual visit in the past 6 mos or appointment in next 3 mos     Recent Outpatient Visits              9 months ago Encounter for screening colonoscopy    [address], [place], Abhijit Mims, DO    Office Visit    9 months ago Colon cancer screening    [address] [facility], [place]    Nurse Only    10 months ago JULIUS (obstructive sleep apnea)    [facility], [address], [place], [place]    Office Visit    1 year ago Grief reaction    [facility], [address], [place], DO    Office Visit    1 year ago Grief reaction    [address], [place], DO    Office Visit                                Recent Outpatient Visits              9 months ago Encounter for screening colonoscopy    [facility], [address], [place], DO    Office Visit    9 months ago Colon cancer screening    [facility], [place]    Nurse Only    10 months ago JULIUS (obstructive sleep apnea)    [facility], [address], [place], Lu[place]ana Mayans, DO    Office Visit    1 year ago Grief reaction    [address], [address], [place], Luwana Mayans, DO    Office Visit    1 year ago Grief reaction    [address] [facility] [address], [facility], DO    Office Visit

## 2023-06-21 DIAGNOSIS — T78.40XA ALLERGIC REACTION, INITIAL ENCOUNTER: ICD-10-CM

## 2023-06-21 RX ORDER — LEVOCETIRIZINE DIHYDROCHLORIDE 5 MG/1
TABLET, FILM COATED ORAL
Qty: 180 TABLET | Refills: 0 | OUTPATIENT
Start: 2023-06-21

## 2023-09-14 DIAGNOSIS — Z12.11 ENCOUNTER FOR SCREENING COLONOSCOPY: ICD-10-CM

## 2023-09-15 RX ORDER — LOSARTAN POTASSIUM 100 MG/1
100 TABLET ORAL DAILY
Qty: 30 TABLET | Refills: 0 | Status: SHIPPED | OUTPATIENT
Start: 2023-09-15

## 2023-09-18 ENCOUNTER — TELEPHONE (OUTPATIENT)
Dept: FAMILY MEDICINE CLINIC | Facility: CLINIC | Age: 48
End: 2023-09-18

## 2023-09-18 DIAGNOSIS — T78.40XA ALLERGIC REACTION, INITIAL ENCOUNTER: ICD-10-CM

## 2023-09-18 DIAGNOSIS — Z12.11 ENCOUNTER FOR SCREENING COLONOSCOPY: ICD-10-CM

## 2023-09-18 RX ORDER — LOSARTAN POTASSIUM 100 MG/1
100 TABLET ORAL DAILY
Qty: 30 TABLET | Refills: 0 | Status: CANCELLED | OUTPATIENT
Start: 2023-09-18

## 2023-09-19 NOTE — TELEPHONE ENCOUNTER
Please review. Protocol failed / Has no protocol. [place] message sent to patient to schedule an office visit with PCP. (Separate encounter for naproxen, routed for phone call and message sent)  Will also make a phone attempt. Requested Prescriptions   Pending Prescriptions Disp Refills    atorvastatin 20 MG Oral Tab 90 tablet 0     Sig: Take 1 tablet (20 mg total) by mouth nightly.        Cholesterol Medication Protocol Failed - 9/18/2023  5:17 AM        Failed - ALT in past 12 months        Failed - LDL in past 12 months        Failed - Last ALT < 80     Lab Results   Component Value Date    ALT 29 08/18/2022             Failed - Last LDL < 130     Lab Results   Component Value Date     (H) 08/18/2022             Failed - In person appointment or virtual visit in the past 12 mos or appointment in next 3 mos     Recent Outpatient Visits              1 year ago Encounter for screening colonoscopy    [facility], [address], [place], Ashu Engel, DO    Office Visit    1 year ago Colon cancer screening    [address], [address] [facility],[address], [facility] 14 Only    1 year ago JULIUS (obstructive sleep apnea)    [facility], [address], [place], Ashu Engel, DO    Office Visit    1 year ago Grief reaction    [address], [address], [place], Ashu Engel, DO    Office Visit    1 year ago Grief reaction    [facility], [address], [place], Ashu Engel, DO    Office Visit                        metFORMIN 500 MG Oral Tab 90 tablet 0     Sig: Take 1 tablet (500 mg total) by mouth once daily with breakfast for diabetes       Diabetes Medication Protocol Failed - 9/18/2023  5:17 AM        Failed - Last A1C < 7.5 and within past 6 months     Lab Results   Component Value Date    A1C 5.9 (H) 08/18/2022             Failed - In person appointment or virtual visit in the past 6 mos or appointment in next 3 mos Recent Outpatient Visits              1 year ago Encounter for screening colonoscopy    [facility], [facility], [place], Kirk Banegas, DO    Office Visit    1 year ago Colon cancer screening    [address], [address] [facility],[address], [place]    Nurse Only    1 year ago JULIUS (obstructive sleep apnea)    [facility], [place], Kirk Mayronna, DO    Office Visit    1 year ago Grief reaction    [facility], [address], [place], Kirk Banegas, DO    Office Visit    1 year ago Grief reaction    [facility], [place] BiddingKaren, DO    Office Visit                      Failed - EGFRCR or GFRNAA > 50     GFR Evaluation            Failed - GFR in the past 12 months          hydrOXYzine 25 MG Oral Tab 30 tablet 0     Sig: Take 1 tablet (25 mg total) by mouth every 8 (eight) hours as needed for Itching.        There is no refill protocol information for this order           Recent Outpatient Visits              1 year ago Encounter for screening colonoscopy    [facility], [place], Kirk Banegas, DO    Office Visit    1 year ago Colon cancer screening    [address], [address] [facility],[address], [place]    Nurse Only    1 year ago JULIUS (obstructive sleep apnea)    [facility], [place], Kirk Banegas, DO    Office Visit    1 year ago Grief reaction    [address], [address] [facility], [place], Kirk Banegas, DO    Office Visit    1 year ago Grief reaction    [address] [facility], [facility], Kirk Banegas, DO    Office Visit

## 2023-09-19 NOTE — TELEPHONE ENCOUNTER
Please review. Protocol failed / Has no protocol. tidy message sent to patient to schedule an office visit with PCP. Will also make a phone attempt.      Requested Prescriptions   Pending Prescriptions Disp Refills    naproxen 500 MG Oral Tab 60 tablet 0     Sig: TAKE ONE TABLET BY MOUTH TWICE A DAY WITH MEALS       Non-Narcotic Pain Medication Protocol Failed - 9/18/2023  5:17 AM        Failed - In person appointment or virtual visit in the past 6 mos or appointment in next 3 mos     Recent Outpatient Visits              1 year ago Encounter for screening colonoscopy    [facility], [address], [place], Dinesh Ojeda, DO    Office Visit    1 year ago Colon cancer screening    [facility], [address], [address] Only    1 year ago JULIUS (obstructive sleep apnea)    [facility], [address], [place], Dinesh Ojeda, DO    Office Visit    1 year ago Grief reaction    [address], [place], [place]    Office Visit    1 year ago Grief reaction    [facility], [address] [place] [address], [address] [place] [address], Dinesh Ojeda, DO    Office Visit                            Recent Outpatient Visits              1 year ago Encounter for screening colonoscopy    [facility], [place], [place]    Office Visit    1 year ago Colon cancer screening    [facility], [address], [place]    Nurse Only    1 year ago JULI[facility] (obstructive sleep apnea)    [facility]    [facility]    1 year ago Grief reaction    [address], [place], Sheppard Anthony, DO    Office Visit    1 year ago Grief reaction    [facility], [address] [place] [address], Dinesh Ojeda, DO    Office Visit

## 2023-09-20 RX ORDER — HYDROXYZINE HYDROCHLORIDE 25 MG/1
25 TABLET, FILM COATED ORAL EVERY 8 HOURS PRN
Qty: 10 TABLET | Refills: 0 | Status: SHIPPED | OUTPATIENT
Start: 2023-09-20

## 2023-09-20 RX ORDER — ATORVASTATIN CALCIUM 20 MG/1
20 TABLET, FILM COATED ORAL NIGHTLY
Qty: 30 TABLET | Refills: 0 | Status: SHIPPED | OUTPATIENT
Start: 2023-09-20

## 2023-09-20 RX ORDER — NAPROXEN 500 MG/1
500 TABLET ORAL 2 TIMES DAILY WITH MEALS
Qty: 60 TABLET | Refills: 0 | OUTPATIENT
Start: 2023-09-20

## 2023-09-21 NOTE — TELEPHONE ENCOUNTER
Talked to patient told him message below understood and he will make an appointment thru his MyChart.

## 2023-09-25 ENCOUNTER — TELEPHONE (OUTPATIENT)
Dept: FAMILY MEDICINE CLINIC | Facility: CLINIC | Age: 48
End: 2023-09-25

## 2023-09-25 NOTE — TELEPHONE ENCOUNTER
Pt requesting an annual physical with Dr Melissa Lennon before October 7th if possible. The soonest in computer was in December. Pt is leaving town Oct. 7th and will not return until April 2024. He states Dr Christine Hay is aware of reason.     Call pt with appt at: [phone]

## 2023-09-26 ENCOUNTER — OFFICE VISIT (OUTPATIENT)
Dept: FAMILY MEDICINE CLINIC | Facility: CLINIC | Age: 48
End: 2023-09-26

## 2023-09-26 ENCOUNTER — LAB ENCOUNTER (OUTPATIENT)
Dept: LAB | Age: 48
End: 2023-09-26
Attending: FAMILY MEDICINE
Payer: MEDICAID

## 2023-09-26 VITALS
OXYGEN SATURATION: 98 % | SYSTOLIC BLOOD PRESSURE: 160 MMHG | WEIGHT: 251 LBS | BODY MASS INDEX: 39.39 KG/M2 | HEIGHT: 67 IN | DIASTOLIC BLOOD PRESSURE: 98 MMHG | HEART RATE: 85 BPM

## 2023-09-26 DIAGNOSIS — E11.69 DIABETES MELLITUS TYPE 2 IN OBESE: Primary | ICD-10-CM

## 2023-09-26 DIAGNOSIS — E11.69 DIABETES MELLITUS TYPE 2 IN OBESE: ICD-10-CM

## 2023-09-26 DIAGNOSIS — E66.9 DIABETES MELLITUS TYPE 2 IN OBESE: Primary | ICD-10-CM

## 2023-09-26 DIAGNOSIS — Z12.11 ENCOUNTER FOR SCREENING COLONOSCOPY: ICD-10-CM

## 2023-09-26 DIAGNOSIS — E66.9 DIABETES MELLITUS TYPE 2 IN OBESE: ICD-10-CM

## 2023-09-26 LAB
ALBUMIN SERPL-MCNC: 3.7 G/DL (ref 3.4–5)
ALBUMIN/GLOB SERPL: 0.8 {RATIO} (ref 1–2)
ALP LIVER SERPL-CCNC: 109 U/L
ALT SERPL-CCNC: 31 U/L
ANION GAP SERPL CALC-SCNC: 5 MMOL/L (ref 0–18)
AST SERPL-CCNC: 12 U/L (ref 15–37)
BILIRUB SERPL-MCNC: 0.4 MG/DL (ref 0.1–2)
BUN BLD-MCNC: 18 MG/DL (ref 7–18)
BUN/CREAT SERPL: 22.5 (ref 10–20)
CALCIUM BLD-MCNC: 9 MG/DL (ref 8.5–10.1)
CHLORIDE SERPL-SCNC: 108 MMOL/L (ref 98–112)
CHOLEST SERPL-MCNC: 163 MG/DL (ref ?–200)
CO2 SERPL-SCNC: 27 MMOL/L (ref 21–32)
COMPLEXED PSA SERPL-MCNC: 0.51 NG/ML (ref ?–4)
CREAT BLD-MCNC: 0.8 MG/DL
CREAT UR-SCNC: 123 MG/DL
EGFRCR SERPLBLD CKD-EPI 2021: 109 ML/MIN/1.73M2 (ref 60–?)
EST. AVERAGE GLUCOSE BLD GHB EST-MCNC: 131 MG/DL (ref 68–126)
FASTING PATIENT LIPID ANSWER: YES
FASTING STATUS PATIENT QL REPORTED: YES
GLOBULIN PLAS-MCNC: 4.5 G/DL (ref 2.8–4.4)
GLUCOSE BLD-MCNC: 123 MG/DL (ref 70–99)
HBA1C MFR BLD: 6.2 % (ref ?–5.7)
HDLC SERPL-MCNC: 40 MG/DL (ref 40–59)
LDLC SERPL CALC-MCNC: 99 MG/DL (ref ?–100)
MICROALBUMIN UR-MCNC: 1.54 MG/DL
MICROALBUMIN/CREAT 24H UR-RTO: 12.5 UG/MG (ref ?–30)
NONHDLC SERPL-MCNC: 123 MG/DL (ref ?–130)
OSMOLALITY SERPL CALC.SUM OF ELEC: 293 MOSM/KG (ref 275–295)
POTASSIUM SERPL-SCNC: 4.3 MMOL/L (ref 3.5–5.1)
PROT SERPL-MCNC: 8.2 G/DL (ref 6.4–8.2)
SODIUM SERPL-SCNC: 140 MMOL/L (ref 136–145)
TRIGL SERPL-MCNC: 132 MG/DL (ref 30–149)
TSI SER-ACNC: 2.33 MIU/ML (ref 0.36–3.74)
VLDLC SERPL CALC-MCNC: 22 MG/DL (ref 0–30)

## 2023-09-26 PROCEDURE — 84443 ASSAY THYROID STIM HORMONE: CPT

## 2023-09-26 PROCEDURE — 3008F BODY MASS INDEX DOCD: CPT | Performed by: FAMILY MEDICINE

## 2023-09-26 PROCEDURE — 80053 COMPREHEN METABOLIC PANEL: CPT

## 2023-09-26 PROCEDURE — 80061 LIPID PANEL: CPT

## 2023-09-26 PROCEDURE — 82043 UR ALBUMIN QUANTITATIVE: CPT

## 2023-09-26 PROCEDURE — 90677 PCV20 VACCINE IM: CPT | Performed by: FAMILY MEDICINE

## 2023-09-26 PROCEDURE — 3080F DIAST BP >= 90 MM HG: CPT | Performed by: FAMILY MEDICINE

## 2023-09-26 PROCEDURE — 3044F HG A1C LEVEL LT 7.0%: CPT | Performed by: FAMILY MEDICINE

## 2023-09-26 PROCEDURE — 3077F SYST BP >= 140 MM HG: CPT | Performed by: FAMILY MEDICINE

## 2023-09-26 PROCEDURE — 90471 IMMUNIZATION ADMIN: CPT | Performed by: FAMILY MEDICINE

## 2023-09-26 PROCEDURE — 36415 COLL VENOUS BLD VENIPUNCTURE: CPT

## 2023-09-26 PROCEDURE — 82570 ASSAY OF URINE CREATININE: CPT

## 2023-09-26 PROCEDURE — 99213 OFFICE O/P EST LOW 20 MIN: CPT | Performed by: FAMILY MEDICINE

## 2023-09-26 PROCEDURE — 83036 HEMOGLOBIN GLYCOSYLATED A1C: CPT

## 2023-09-26 RX ORDER — ATORVASTATIN CALCIUM 20 MG/1
20 TABLET, FILM COATED ORAL NIGHTLY
Qty: 90 TABLET | Refills: 2 | Status: SHIPPED | OUTPATIENT
Start: 2023-09-26

## 2023-09-26 RX ORDER — OMEPRAZOLE 20 MG/1
CAPSULE, DELAYED RELEASE ORAL
Qty: 90 CAPSULE | Refills: 2 | Status: SHIPPED | OUTPATIENT
Start: 2023-09-26

## 2023-09-26 RX ORDER — LOSARTAN POTASSIUM 100 MG/1
100 TABLET ORAL DAILY
Qty: 90 TABLET | Refills: 2 | Status: SHIPPED | OUTPATIENT
Start: 2023-09-26

## 2023-09-26 RX ORDER — FLUOXETINE HYDROCHLORIDE 60 MG/1
TABLET, FILM COATED ORAL; ORAL
Qty: 45 TABLET | Refills: 2 | Status: SHIPPED | OUTPATIENT
Start: 2023-09-26

## 2023-10-03 ENCOUNTER — TELEPHONE (OUTPATIENT)
Dept: FAMILY MEDICINE CLINIC | Facility: CLINIC | Age: 48
End: 2023-10-03

## 2023-10-03 ENCOUNTER — LAB ENCOUNTER (OUTPATIENT)
Dept: LAB | Age: 48
End: 2023-10-03
Attending: FAMILY MEDICINE
Payer: MEDICAID

## 2023-10-03 ENCOUNTER — OFFICE VISIT (OUTPATIENT)
Dept: FAMILY MEDICINE CLINIC | Facility: CLINIC | Age: 48
End: 2023-10-03

## 2023-10-03 VITALS
HEIGHT: 67 IN | WEIGHT: 250 LBS | SYSTOLIC BLOOD PRESSURE: 132 MMHG | BODY MASS INDEX: 39.24 KG/M2 | HEART RATE: 81 BPM | DIASTOLIC BLOOD PRESSURE: 81 MMHG

## 2023-10-03 DIAGNOSIS — F43.21 GRIEF REACTION: ICD-10-CM

## 2023-10-03 DIAGNOSIS — E11.69 DIABETES MELLITUS TYPE 2 IN OBESE: Primary | ICD-10-CM

## 2023-10-03 DIAGNOSIS — Z00.00 ROUTINE PHYSICAL EXAMINATION: Primary | ICD-10-CM

## 2023-10-03 DIAGNOSIS — G47.33 OSA (OBSTRUCTIVE SLEEP APNEA): ICD-10-CM

## 2023-10-03 DIAGNOSIS — E66.9 DIABETES MELLITUS TYPE 2 IN OBESE: ICD-10-CM

## 2023-10-03 DIAGNOSIS — E11.69 DIABETES MELLITUS TYPE 2 IN OBESE: ICD-10-CM

## 2023-10-03 DIAGNOSIS — I10 ESSENTIAL HYPERTENSION: ICD-10-CM

## 2023-10-03 DIAGNOSIS — E66.9 DIABETES MELLITUS TYPE 2 IN OBESE: Primary | ICD-10-CM

## 2023-10-03 LAB
ALBUMIN SERPL-MCNC: 3.8 G/DL (ref 3.4–5)
ALBUMIN/GLOB SERPL: 0.8 {RATIO} (ref 1–2)
ALP LIVER SERPL-CCNC: 97 U/L
ALT SERPL-CCNC: 28 U/L
ANION GAP SERPL CALC-SCNC: 7 MMOL/L (ref 0–18)
AST SERPL-CCNC: 11 U/L (ref 15–37)
BILIRUB SERPL-MCNC: 0.7 MG/DL (ref 0.1–2)
BUN BLD-MCNC: 14 MG/DL (ref 7–18)
BUN/CREAT SERPL: 16.7 (ref 10–20)
CALCIUM BLD-MCNC: 9 MG/DL (ref 8.5–10.1)
CHLORIDE SERPL-SCNC: 109 MMOL/L (ref 98–112)
CHOLEST SERPL-MCNC: 171 MG/DL (ref ?–200)
CO2 SERPL-SCNC: 27 MMOL/L (ref 21–32)
CREAT BLD-MCNC: 0.84 MG/DL
EGFRCR SERPLBLD CKD-EPI 2021: 108 ML/MIN/1.73M2 (ref 60–?)
EST. AVERAGE GLUCOSE BLD GHB EST-MCNC: 137 MG/DL (ref 68–126)
FASTING PATIENT LIPID ANSWER: YES
FASTING STATUS PATIENT QL REPORTED: YES
GLOBULIN PLAS-MCNC: 4.7 G/DL (ref 2.8–4.4)
GLUCOSE BLD-MCNC: 114 MG/DL (ref 70–99)
HBA1C MFR BLD: 6.4 % (ref ?–5.7)
HDLC SERPL-MCNC: 37 MG/DL (ref 40–59)
LDLC SERPL CALC-MCNC: 107 MG/DL (ref ?–100)
NONHDLC SERPL-MCNC: 134 MG/DL (ref ?–130)
OSMOLALITY SERPL CALC.SUM OF ELEC: 297 MOSM/KG (ref 275–295)
POTASSIUM SERPL-SCNC: 3.8 MMOL/L (ref 3.5–5.1)
PROT SERPL-MCNC: 8.5 G/DL (ref 6.4–8.2)
SODIUM SERPL-SCNC: 143 MMOL/L (ref 136–145)
TRIGL SERPL-MCNC: 149 MG/DL (ref 30–149)
VLDLC SERPL CALC-MCNC: 25 MG/DL (ref 0–30)

## 2023-10-03 PROCEDURE — 80053 COMPREHEN METABOLIC PANEL: CPT

## 2023-10-03 PROCEDURE — 36415 COLL VENOUS BLD VENIPUNCTURE: CPT

## 2023-10-03 PROCEDURE — 3008F BODY MASS INDEX DOCD: CPT | Performed by: FAMILY MEDICINE

## 2023-10-03 PROCEDURE — 83036 HEMOGLOBIN GLYCOSYLATED A1C: CPT

## 2023-10-03 PROCEDURE — 3079F DIAST BP 80-89 MM HG: CPT | Performed by: FAMILY MEDICINE

## 2023-10-03 PROCEDURE — 99396 PREV VISIT EST AGE 40-64: CPT | Performed by: FAMILY MEDICINE

## 2023-10-03 PROCEDURE — 80061 LIPID PANEL: CPT

## 2024-02-28 DIAGNOSIS — Z12.11 ENCOUNTER FOR SCREENING COLONOSCOPY: ICD-10-CM

## 2024-02-29 RX ORDER — LOSARTAN POTASSIUM 100 MG/1
100 TABLET ORAL DAILY
Qty: 90 TABLET | Refills: 2 | OUTPATIENT
Start: 2024-02-29

## 2024-02-29 RX ORDER — NAPROXEN 500 MG/1
TABLET ORAL
Qty: 60 TABLET | Refills: 0 | OUTPATIENT
Start: 2024-02-29

## 2024-02-29 RX ORDER — ATORVASTATIN CALCIUM 20 MG/1
20 TABLET, FILM COATED ORAL NIGHTLY
Qty: 90 TABLET | Refills: 2 | OUTPATIENT
Start: 2024-02-29

## 2024-02-29 NOTE — TELEPHONE ENCOUNTER
Refill passed per [facility] protocol.  Requested Prescriptions   Pending Prescriptions Disp Refills    naproxen 500 MG Oral Tab 60 tablet 0     Sig: TAKE ONE TABLET BY MOUTH TWICE A DAY WITH MEALS       Non-Narcotic Pain Medication Protocol Passed - 2/28/2024 12:08 PM        Passed - In person appointment or virtual visit in the past 6 mos or appointment in next 3 mos     Recent Outpatient Visits              4 months ago Routine physical examination    [facility], [facility], [place], [place]    Office Visit    5 months ago Diabetes mellitus type 2 in obese (HCC)    [facility], [facility], [place], David B, DO    Office Visit    1 year ago Encounter for screening colonoscopy    [facility], [facility], [place], DO    Office Visit    1 year ago Colon cancer screening    [facility], [place]    Nurse Only    1 year ago JULIUS (obstructive sleep apnea)    [facility], [facility], [place], DO    Office Visit                       Refused Prescriptions Disp Refills    atorvastatin 20 MG Oral Tab 90 tablet 2     Sig: Take 1 tablet (20 mg total) by mouth nightly. **Appointment needed for further refills.       Cholesterol Medication Protocol Passed - 2/28/2024 12:08 PM        Passed - ALT < 80     Lab Results   Component Value Date    ALT 28 10/03/2023             Passed - ALT resulted within past year        Passed - Lipid panel within past 12 months     Lab Results   Component Value Date    CHOLEST 171 10/03/2023    TRIG 149 10/03/2023    HDL 37 (L) 10/03/2023     (H) 10/03/2023    VLDL 25 10/03/2023    NONHDLC 134 (H) 10/03/2023             Passed - In person appointment or virtual visit in the past 12 mos or appointment in next 3 mos     Recent Outpatient Visits              4 months ago Routine physical examination    [facility], [facility], [place], David B, DO    Office Visit    5 months ago Diabetes mellitus type 2 in obese (HCC)    [facility], [facility], [place], David B, DO    Office Visit    1 year ago Encounter for screening colonoscopy    [facility], [place], David B, DO    Office Visit    1 year ago Colon cancer screening    [facility], [facility], [place]    Nurse Only    1 year ago JULIUS (obstructive sleep apnea)    [facility], [facility], [place], David B, DO    Office Visit                        losartan 100 MG Oral Tab 90 tablet 2     Sig: Take 1 tablet (100 mg total) by mouth daily.       Hypertension Medications Protocol Passed - 2/28/2024 12:08 PM        Passed - CMP or BMP in past 12 months        Passed - Last BP reading less than 140/90     BP Readings from Last 1 Encounters:   10/03/23 132/81               Passed - In person appointment or virtual visit in the past 12 mos or appointment in next 3 mos     Recent Outpatient Visits              4 months ago Routine physical examination    [facility], [facility], [place], David B, DO    Office Visit    5 months ago Diabetes mellitus type 2 in obese (HCC)    [facility], [place], David B, DO    Office Visit    1 year ago Encounter for screening colonoscopy    [facility], [place], David B, DO    Office Visit    1 year ago Colon cancer screening    [facility] [place]    Nurse Only    1 year ago JULIUS (obstructive sleep apnea)    [facility], [place], David B, DO    Office Visit                      Passed - EGFRCR or GFRNAA > 50     GFR Evaluation  EGFRCR: 108 , resulted on 10/3/2023            metFORMIN 500 MG Oral Tab 90 tablet 2     Sig: Take 1 tablet (500 mg  total) by mouth daily with breakfast. for diabetes   **Appointment needed for further refills.       Diabetes Medication Protocol Passed - 2/28/2024 12:08 PM        Passed - Last A1C < 7.5 and within past 6 months     Lab Results   Component Value Date    A1C 6.4 (H) 10/03/2023             Passed - In person appointment or virtual visit in the past 6 mos or appointment in next 3 mos     Recent Outpatient Visits              4 months ago Routine physical examination    [facility], [facility], [place], David B, DO    Office Visit    5 months ago Diabetes mellitus type 2 in obese (HCC)    [facility], [place], David B, DO    Office Visit    1 year ago Encounter for screening colonoscopy    [facility], [place], David B, DO    Office Visit    1 year ago Colon cancer screening    [facility], [place]    Nurse Only    1 year ago JULIUS (obstructive sleep apnea)    [facility], [place], David B, DO    Office Visit                      Passed - Microalbumin procedure in past 12 months or taking ACE/ARB        Passed - EGFRCR or GFRNAA > 50     GFR Evaluation  EGFRCR: 108 , resulted on 10/3/2023          Passed - GFR in the past 12 months           Recent Outpatient Visits              4 months ago Routine physical examination    [facility], [facility], [place], David B, DO    Office Visit    5 months ago Diabetes mellitus type 2 in obese (HCC)    [facility], [place], David B, DO    Office Visit    1 year ago Encounter for screening colonoscopy    [facility], [place], David B, DO    Office Visit    1 year ago Colon cancer screening    [facility], [place]    Nurse Only    1 year ago JULIUS (obstructive sleep  apnea)    [facility], [facility], [place], [place]    Office Visit

## 2024-02-29 NOTE — TELEPHONE ENCOUNTER
Refill passed per [facility] protocol.  Please review last office visit  10/03/23      last refill 06/24/2022    Is refill appropriate?     Requested Prescriptions   Pending Prescriptions Disp Refills    naproxen 500 MG Oral Tab 60 tablet 0     Sig: TAKE ONE TABLET BY MOUTH TWICE A DAY WITH MEALS       Non-Narcotic Pain Medication Protocol Passed - 2/28/2024 12:08 PM        Passed - In person appointment or virtual visit in the past 6 mos or appointment in next 3 mos     Recent Outpatient Visits              4 months ago Routine physical examination    [facility], [facility], [place], David B, DO    Office Visit    5 months ago Diabetes mellitus type 2 in obese (HCC)    [facility] [place]    [facility] Visit    1 year ago Encounter for screening colonoscopy    [facility], [place], David B, DO    Office Visit    1 year ago Colon cancer screening    [facility], [place]    Nurse Only    1 year ago JULIUS (obstructive sleep apnea)    [facility], [place], David B, DO    Office Visit                       Refused Prescriptions Disp Refills    atorvastatin 20 MG Oral Tab 90 tablet 2     Sig: Take 1 tablet (20 mg total) by mouth nightly. **Appointment needed for further refills.       Cholesterol Medication Protocol Passed - 2/28/2024 12:08 PM        Passed - ALT < 80     Lab Results   Component Value Date    ALT 28 10/03/2023             Passed - ALT resulted within past year        Passed - Lipid panel within past 12 months     Lab Results   Component Value Date    CHOLEST 171 10/03/2023    TRIG 149 10/03/2023    HDL 37 (L) 10/03/2023     (H) 10/03/2023    VLDL 25 10/03/2023    NONHDLC 134 (H) 10/03/2023             Passed - In person appointment or virtual visit in the past 12 mos or appointment in next 3 mos     Recent  Outpatient Visits              4 months ago Routine physical examination    [facility], [facility], [place], David B, DO    Office Visit    5 months ago Diabetes mellitus type 2 in obese (HCC)    [facility], [place], David B, DO    Office Visit    1 year ago Encounter for screening colonoscopy    [facility] [place] David B, DO    Office Visit    1 year ago Colon cancer screening    [facility], [place]    Nurse Only    1 year ago JULIUS (obstructive sleep apnea)    [facility], [facility], [place], David B, DO    Office Visit                        losartan 100 MG Oral Tab 90 tablet 2     Sig: Take 1 tablet (100 mg total) by mouth daily.       Hypertension Medications Protocol Passed - 2/28/2024 12:08 PM        Passed - CMP or BMP in past 12 months        Passed - Last BP reading less than 140/90     BP Readings from Last 1 Encounters:   10/03/23 132/81               Passed - In person appointment or virtual visit in the past 12 mos or appointment in next 3 mos     Recent Outpatient Visits              4 months ago Routine physical examination    [facility], [facility], [place], David B, DO    Office Visit    5 months ago Diabetes mellitus type 2 in obese (HCC)    [facility], [place], David B, DO    Office Visit    1 year ago Encounter for screening colonoscopy    [facility] [place] David B, DO    Office Visit    1 year ago Colon cancer screening    [facility]    [facility]urse Only    1 year ago JULIUS (obstructive sleep apnea)    [facility], [place], David B, DO    Office Visit                      Passed - EGFRCR or GFRNAA > 50     GFR Evaluation  EGFRCR: 108 ,  resulted on 10/3/2023            metFORMIN 500 MG Oral Tab 90 tablet 2     Sig: Take 1 tablet (500 mg total) by mouth daily with breakfast. for diabetes   **Appointment needed for further refills.       Diabetes Medication Protocol Passed - 2/28/2024 12:08 PM        Passed - Last A1C < 7.5 and within past 6 months     Lab Results   Component Value Date    A1C 6.4 (H) 10/03/2023             Passed - In person appointment or virtual visit in the past 6 mos or appointment in next 3 mos     Recent Outpatient Visits              4 months ago Routine physical examination    [facility], [facility], [place], David B, DO    Office Visit    5 months ago Diabetes mellitus type 2 in obese (HCC)    [facility], [place], David B, DO    Office Visit    1 year ago Encounter for screening colonoscopy    [facility], [place], David B, DO    Office Visit    1 year ago Colon cancer screening    [facility], [place]    Nurse Only    1 year ago JULIUS (obstructive sleep apnea)    [facility], [place], David B, DO    Office Visit                      Passed - Microalbumin procedure in past 12 months or taking ACE/ARB        Passed - EGFRCR or GFRNAA > 50     GFR Evaluation  EGFRCR: 108 , resulted on 10/3/2023          Passed - GFR in the past 12 months           Recent Outpatient Visits              4 months ago Routine physical examination    [facility], [place], David B, DO    Office Visit    5 months ago Diabetes mellitus type 2 in obese (HCC)    [facility], [place], David B, DO    Office Visit    1 year ago Encounter for screening colonoscopy    [facility], [place], David B, DO    Office Visit    1 year ago Colon cancer screening     [facility], [facility], [place]    Nurse Only    1 year ago JULIUS (obstructive sleep apnea)    [facility], [facility], [place], David B, DO    Office Visit

## 2024-08-13 DIAGNOSIS — Z12.11 ENCOUNTER FOR SCREENING COLONOSCOPY: ICD-10-CM

## 2024-08-16 RX ORDER — LOSARTAN POTASSIUM 100 MG/1
100 TABLET ORAL DAILY
Qty: 90 TABLET | Refills: 3 | Status: SHIPPED | OUTPATIENT
Start: 2024-08-16

## 2024-08-16 NOTE — TELEPHONE ENCOUNTER
Refill Per Protocol     Requested Prescriptions   Pending Prescriptions Disp Refills    LOSARTAN 100 MG Oral Tab [Pharmacy Med Name: Losartan Potassium 100 Mg Tab [place]] 90 tablet 0     Sig: Take 1 tablet (100 mg total) by mouth daily.       Hypertension Medications Protocol Passed - 8/13/2024  3:00 AM        Passed - CMP or BMP in past 12 months        Passed - Last BP reading less than 140/90     BP Readings from Last 1 Encounters:   10/03/23 132/81               Passed - In person appointment or virtual visit in the past 12 mos or appointment in next 3 mos     Recent Outpatient Visits              10 months ago Routine physical examination    [facility], [facility], [place], David B, DO    Office Visit    10 months ago Diabetes mellitus type 2 in obese (HCC)    [facility], [place], David B, DO    Office Visit    1 year ago Encounter for screening colonoscopy    [facility], [place], David B, DO    Office Visit    1 year ago Colon cancer screening    [facility] [facility], [place]    Nurse Only    2 years ago JULIUS (obstructive sleep apnea)    [facility], [facility], [place], David B, DO    Office Visit                      Passed - EGFRCR or GFRNAA > 50     GFR Evaluation  EGFRCR: 108 , resulted on 10/3/2023                   Recent Outpatient Visits              10 months ago Routine physical examination    [facility], [facility], [place], David B, DO    Office Visit    10 months ago Diabetes mellitus type 2 in obese (HCC)    [facility], [place], David B, DO    Office Visit    1 year ago Encounter for screening colonoscopy    [facility], [place], David B, DO    Office Visit    1 year ago Colon cancer screening    [facility]  [address], [place]    Nurse Only    2 years ago JULIUS (obstructive sleep apnea)    [facility], [facility], [place], David B, DO    Office Visit

## 2024-08-19 NOTE — TELEPHONE ENCOUNTER
Pharmacy requesting refill of       naproxen 500 MG Oral Tab, TAKE ONE TABLET BY MOUTH TWICE A DAY WITH MEALS, Disp: 60 tablet, Rfl: 0

## 2024-08-21 NOTE — TELEPHONE ENCOUNTER
Please review. Protocol Failed; No Protocol    Please advise if refill is appropriate .     Last written : 6/24/2022    Requested Prescriptions   Pending Prescriptions Disp Refills    naproxen 500 MG Oral Tab 60 tablet 0     Sig: TAKE ONE TABLET BY MOUTH TWICE A DAY WITH MEALS       Non-Narcotic Pain Medication Protocol Failed - 8/19/2024  2:31 PM        Failed - In person appointment or virtual visit in the past 6 mos or appointment in next 3 mos     Recent Outpatient Visits              10 months ago Routine physical examination    [facility], [facility], [place], David B, DO    Office Visit    11 months ago Diabetes mellitus type 2 in obese (HCC)    [facility], [place], David B, DO    Office Visit    1 year ago Encounter for screening colonoscopy    [facility], [place], David B, DO    Office Visit    1 year ago Colon cancer screening    [facility], [place]    Nurse Only    2 years ago JULIUS (obstructive sleep apnea)    [facility], [place], David B, DO    Office Visit                               Recent Outpatient Visits              10 months ago Routine physical examination    [facility], [place], David B, DO    Office Visit    11 months ago Diabetes mellitus type 2 in obese (HCC)    [facility], [place], David B, DO    Office Visit    1 year ago Encounter for screening colonoscopy    [facility], [place], David B, DO    Office Visit    1 year ago Colon cancer screening    [facility], [place]    Nurse Only    2 years ago JULIUS (obstructive sleep apnea)    [facility], [place], David B, DO    Office Visit

## 2024-08-22 RX ORDER — NAPROXEN 500 MG/1
TABLET ORAL
Qty: 60 TABLET | Refills: 0 | OUTPATIENT
Start: 2024-08-22

## 2024-10-21 ENCOUNTER — TELEPHONE (OUTPATIENT)
Dept: FAMILY MEDICINE CLINIC | Facility: CLINIC | Age: 49
End: 2024-10-21

## 2024-10-23 RX ORDER — NAPROXEN 500 MG/1
TABLET ORAL
Qty: 60 TABLET | Refills: 0 | OUTPATIENT
Start: 2024-10-23

## 2024-10-23 NOTE — TELEPHONE ENCOUNTER
Please review; protocol failed    Medication has not been prescribed for patient since 6/2022    [employer] message sent to patient to schedule an office visit with PCP.   Routed to Call Center to call patient and make an appointment.    No future appointments.  Last office visit: 10/3/2023    Requested Prescriptions   Pending Prescriptions Disp Refills    naproxen 500 MG Oral Tab 60 tablet 0     Sig: TAKE ONE TABLET BY MOUTH TWICE A DAY WITH MEALS       Non-Narcotic Pain Medication Protocol Failed - 10/23/2024  3:39 PM        Failed - In person appointment or virtual visit in the past 6 mos or appointment in next 3 mos     Recent Outpatient Visits              1 year ago Routine physical examination    [facility], [facility], [place] Cespedes, David B, DO    Office Visit    1 year ago Diabetes mellitus type 2 in obese (HCC)    [facility], [place] Cespedes, David B, DO    Office Visit    2 years ago Encounter for screening colonoscopy    [facility], [place] Cespedes, David B, DO    Office Visit    2 years ago Colon cancer screening    [facility], [place]    Nurse Only    2 years ago JULIUS (obstructive sleep apnea)    [facility], [place] Cespedes, David B, DO    Office Visit                             Recent Outpatient Visits              1 year ago Routine physical examination    [facility], [place] Cespedes, David B, DO    Office Visit    1 year ago Diabetes mellitus type 2 in obese (HCC)    [facility], [place] Cespedes, David B, DO    Office Visit    2 years ago Encounter for screening colonoscopy    [facility], [place] Cespedes, David B, DO    Office Visit    2 years ago Colon cancer screening    [facility], [place]    Nurse Only    2 years ago  JULIUS (obstructive sleep apnea)    [facility] [address], [place] Mihai Vergara, DO    Office Visit

## 2024-10-23 NOTE — TELEPHONE ENCOUNTER
Please call patient to assist in making an appointment. Thank you      Last office visit: 10/3/2023    A [employer] message has been sent to patient

## 2024-11-11 DIAGNOSIS — I10 ESSENTIAL HYPERTENSION: Primary | ICD-10-CM

## 2024-11-11 DIAGNOSIS — E11.9 CONTROLLED TYPE 2 DIABETES MELLITUS WITHOUT COMPLICATION, WITHOUT LONG-TERM CURRENT USE OF INSULIN (HCC): ICD-10-CM

## 2024-11-14 RX ORDER — ATORVASTATIN CALCIUM 20 MG/1
20 TABLET, FILM COATED ORAL NIGHTLY
Qty: 30 TABLET | Refills: 0 | Status: SHIPPED | OUTPATIENT
Start: 2024-11-14

## 2024-11-14 NOTE — TELEPHONE ENCOUNTER
Please review. Protocol Failed; No Protocol    No future appointments.    [employer] message sent to patient to schedule an office visit with Provider and/or  Routed to Patient  for assistance with appointment.     Requested Prescriptions   Pending Prescriptions Disp Refills    METFORMIN 500 MG Oral Tab [Pharmacy Med Name: Metformin Hydrochloride 500 Mg Tab Jose Angel] 90 tablet 0     Sig: Take 1 tablet (500 mg total) by mouth daily with breakfast. for diabetes   **Appointment needed for further refills.       Diabetes Medication Protocol Failed - 11/14/2024 10:57 AM        Failed - Last A1C < 7.5 and within past 6 months     Lab Results   Component Value Date    A1C 6.4 (H) 10/03/2023             Failed - In person appointment or virtual visit in the past 6 mos or appointment in next 3 mos     Recent Outpatient Visits              1 year ago Routine physical examination    [place], [facility], [place] Cespedes, David B, DO    Office Visit    1 year ago Diabetes mellitus type 2 in obese (HCC)    [facility], [place] Cespedes, David B,     Office Visit    2 years ago Encounter for screening colonoscopy    [place] [facility], [place] Cespedes, David B,     Office Visit    2 years ago Colon cancer screening    [facility]    Nurse Only    2 years ago JULIUS (obstructive sleep apnea)    [facility], [place] Cespedes, David B, DO    Office Visit                      Failed - EGFRCR or GFRNAA > 50     GFR Evaluation            Failed - GFR in the past 12 months        Passed - Microalbumin procedure in past 12 months or taking ACE/ARB          ATORVASTATIN 20 MG Oral Tab [Pharmacy Med Name: Atorvastatin Calcium 20 Mg Tab Nort] 90 tablet 0     Sig: Take 1 tablet (20 mg total) by mouth nightly. **Appointment needed for further refills.       Cholesterol  Medication Protocol Failed - 11/14/2024 10:57 AM        Failed - ALT < 80     Lab Results   Component Value Date    ALT 28 10/03/2023             Failed - ALT resulted within past year        Failed - Lipid panel within past 12 months     Lab Results   Component Value Date    CHOLEST 171 10/03/2023    TRIG 149 10/03/2023    HDL 37 (L) 10/03/2023     (H) 10/03/2023    VLDL 25 10/03/2023    NONHDLC 134 (H) 10/03/2023             Failed - In person appointment or virtual visit in the past 12 mos or appointment in next 3 mos     Recent Outpatient Visits              1 year ago Routine physical examination    [facility], [place]    Office Visit    1 year ago Diabetes mellitus type 2 in obese (HCC)    [facility], [place] Cespedes, David B, DO    Office Visit    2 [facility]    [facility]    2 years ago Colon cancer screening    [facility], [place]    Nurse Only    2 years ago JULIUS (obstructive sleep apnea)    [facility]    [facility]                               Recent Outpatient Visits              1 year ago Routine physical examination    [facility] [place]    Office Visit    1 year ago Diabetes mellitus type 2 in obese (HCC)    [facility]    [facility]    2 years ago [facility] colonoscopy    [facility]    [facility]    2 years ago Colon cancer screening    [facility], [place]    Nurse Only    2 years ago JULIUS (obstructive sleep apnea)    [facility], [address], [place]  Mihai Vergara, DO    Office Visit

## 2024-11-16 ENCOUNTER — TELEPHONE (OUTPATIENT)
Dept: FAMILY MEDICINE CLINIC | Facility: CLINIC | Age: 49
End: 2024-11-16

## 2024-11-16 NOTE — TELEPHONE ENCOUNTER
naproxen 500 MG Oral Tab, TAKE ONE TABLET BY MOUTH TWICE A DAY WITH MEALS, Disp: 60 tablet, Rfl: 0

## 2024-11-21 RX ORDER — NAPROXEN 500 MG/1
TABLET ORAL
Qty: 60 TABLET | Refills: 0 | OUTPATIENT
Start: 2024-11-21

## 2024-11-21 NOTE — TELEPHONE ENCOUNTER
Please call patient to make an appointment.  Thank you     Naproxen Last written 6/24/2022  Patient is overdue for Physical Exam

## 2024-11-25 NOTE — TELEPHONE ENCOUNTER
2nd attempt - tried to contact patient but voice mail is full, patient have not read [employer] yet,

## 2024-12-13 NOTE — TELEPHONE ENCOUNTER
Received a call from pharmacy, Patient's date of birth and full name both confirmed.   Asking for update on Naproxen Prescription     Rn advised her that patient needs to call the office and needs appointment.   She verbalizes understanding of all information, and agreeable to plan to discuss with patient.

## 2024-12-17 NOTE — TELEPHONE ENCOUNTER
Patient sent [employer] messages and sent to call center to schedule an appt.  Diabetes Medications  Protocol Criteria:  Appointment scheduled in the past 6 months or the next 3 months  A1C < 7.5 in the past 6 months  Creatinine in the past 12 months  Creatinine result < 1.5   Recent Outpatient Visits              1 year ago Routine physical examination    [facility], [facility], [place] Cespedes, David B, DO    Office Visit    1 year ago Diabetes mellitus type 2 in obese (HCC)    [facility], [place] Cespedes, David B, DO    Office Visit    2 years ago Encounter for screening colonoscopy    [facility], [place] Cespedes, David B, DO    Office Visit    2 years ago Colon cancer screening    [facility]    Nurse Only    2 years ago JULIUS (obstructive sleep apnea)    [facility], [place] Cespedes, David B, DO    Office Visit            Lab Results   Component Value Date    A1C 6.4 (H) 10/03/2023     Lab Results   Component Value Date    CREATSERUM 0.84 10/03/2023

## 2024-12-19 NOTE — TELEPHONE ENCOUNTER
Please Review. Protocol Failed; No Protocol   Mycharted patient to schedule an appointment.   Sent to Patient  to call patient to schedule an appointment 2 attempts made unable to get in touch with patient  Letter sent 12/19/2024 for scheduling appointment   Short supply pended     Requested Prescriptions   Pending Prescriptions Disp Refills    METFORMIN 500 MG Oral Tab [Pharmacy Med Name: Metformin Hydrochloride 500 Mg Tab Jose Angel] 30 tablet 0     Sig: Take 1 tablet (500 mg total) by mouth daily with breakfast. PATIENT NEEDS FASTING BLOOD TEST FOLLOWED BY APPOINTMENT.       Diabetes Medication Protocol Failed - 12/19/2024  8:52 AM        Failed - Last A1C < 7.5 and within past 6 months     Lab Results   Component Value Date    A1C 6.4 (H) 10/03/2023             Failed - In person appointment or virtual visit in the past 6 mos or appointment in next 3 mos     Recent Outpatient Visits              1 year ago Routine physical examination    [facility], [place] Cespedes, David B, DO    Office Visit    1 year ago Diabetes mellitus type 2 in obese (HCC)    [facility], [place] Cespedes, David B,     Office Visit    2 years ago Encounter for screening colonoscopy    [facility], [place] Cespedes, David B,     Office Visit    2 [facility]    [facility]    2 years ago JULIUS (obstructive sleep apnea)    [facility], [place] Cespedes, David B, DO    Office Visit                      Failed - EGFRCR or GFRNAA > 50     GFR Evaluation            Failed - GFR in the past 12 months        Passed - Microalbumin procedure in past 12 months or taking ACE/ARB                 Recent Outpatient Visits              1 year ago Routine physical examination    [facility], [place] Cespedes,  Mihai WELLS, DO    Office Visit    1 year ago Diabetes mellitus type 2 in obese (HCC)    [facility], [place] Cespedes, David B, DO    Office Visit    2 years ago Encounter for screening colonoscopy    [facility], [place] Cespedes, David B, DO    Office Visit    2 years ago Colon cancer screening    [facility] [place]    Nurse Only    2 years ago JULIUS (obstructive sleep apnea)    [facility], [place] Cespedes, David B, DO    Office Visit

## 2025-08-12 ENCOUNTER — TELEPHONE (OUTPATIENT)
Dept: FAMILY MEDICINE CLINIC | Facility: CLINIC | Age: 50
End: 2025-08-12

## 2025-08-12 DIAGNOSIS — Z12.11 ENCOUNTER FOR SCREENING COLONOSCOPY: ICD-10-CM

## 2025-08-14 RX ORDER — LOSARTAN POTASSIUM 100 MG/1
100 TABLET ORAL DAILY
Qty: 30 TABLET | Refills: 0 | Status: SHIPPED | OUTPATIENT
Start: 2025-08-14

## (undated) NOTE — LETTER
06/04/21        Guerline Pardo  [address] [facility]      Dear Kacey Miranda,    Our records indicate that you have outstanding lab work and or testing that was ordered for you and has not yet been completed:  Orders Placed This Encounter      CM

## (undated) NOTE — LETTER
01/26/22        Sarah Negron  [address] [facility]      Dear Lucía Reina,    Our records indicate that you have outstanding lab work and or testing that was ordered for you and has not yet been completed:  Orders Placed This Encounter      Ad

## (undated) NOTE — LETTER
06/18/21        Kierra Ortez  [address] [place] [address]      Dear Aristeo Castillo,    Our records indicate that you have outstanding lab work and or testing that was ordered for you and has not yet been completed:  Orders Placed This Encounter      CM

## (undated) NOTE — MR AVS SNAPSHOT
[facility] [place] [address] [address] [phone]               Thank you for choosing us for your health care visit with Amanda Weeks MD.  We are glad to serve you and happy to provide you with this summary o Your unique [employer] Access Code: [email or web address]  Expires: 5/9/2017 10:54 PM    If you have questions, you can call [phone] to talk to our [facility] Staff. Remember, [employer] is NOT to be used for urgent needs. For medical emergencies, dial 911.

## (undated) NOTE — MR AVS SNAPSHOT
[facility]  [address] [place] [address], [address] [place] [address]  Jameel Rey               Thank you for choosing us for your health care visit with Delmar Vergara DO.   We are glad to serve you and happy to provide you with this summary Normal Orders This Visit    ALLERGY - INTERNAL [[id number] CUSTOM]  Order #:  [id number]         **REFERRAL REQUEST**    Your physician has referred you to a specialist.  Your physician or the clinic staff will provide you with the phone number you should c Is this a split study with titration if patient meets criteria?:  Yes    Indications/Symptoms:  Apnea    Hypersomnia    Snoring    Comorbidities: (Check all that apply):  None    Follow-Up Care:  Post-test consultation with sleep specialist    Assoc Dx: information, go to [email or web address] and click on the Sign Up Now link in the [employer] box. Enter your [employer] Activation Code exactly as it appears below along with your Zip Code and Date of Birth to complete the sign-up process.  If you do Make half your plate fruits and vegetables Highly refined, white starches including white bread, rice and pasta   Eat plenty of protein, keep the fat content low Sugars:  sodas and sports drinks, candies and desserts   Eat plenty of low-fat dairy products

## (undated) NOTE — LETTER
12/19/2024    Hima Tenorio  [address] [place] [address]         Dear Hima,    This letter is to inform you that our office has made several attempts to reach you by phone without success.  We were attempting to contact you regarding scheduling an appointment.    Please schedule at your earliest convenience so we can continue to provide refills without interruption.  You can schedule an appointment using the scheduling feature in your [employer] account or by calling [phone], to schedule an appointment say yes to the prompt or say \"appointment\".      Our phone hours  Monday - Friday  8 AM - 4:30 PM    Saturday                     8 AM- 12:00 PM  & Sunday                      Closed    Thank You,  Ingrid Ott      Please contact our office at the number listed below as soon as you receive this letter to discuss this issue and to make the necessary changes in our system to your contact information.  Thank you for your cooperation.        Sincerely,    Mihai Vergara,   [address] [place] [address]  Ph: [phone]  Fax: [phone]                         Document electronically generated by:  Namrata ELISE CPhT

## (undated) NOTE — LETTER
01/04/21        Latasha Gerardo  [address] [facility]      Dear Abel Jimenez,    Our records indicate that you have outstanding lab work and or testing that was ordered for you and has not yet been completed:  Orders Placed This Encounter      CM

## (undated) NOTE — ED AVS SNAPSHOT
[facility] Emergency Department    [address] [address]    Phone:  [phone]    Fax:  [phone]           Frankey Fujisawa   MRN: [id number]    Department:  [facility] Emergency Department   Date of Visit:  3/10 predniSONE 20 MG Tabs   Quantity:  8 tablet   Commonly known as:  DELTASONE   Take 2 tablets (40 mg total) by mouth daily.             Where to Get Your Medications      You can get these medications from any pharmacy     Bring a paper prescription for eac primary care or specialist physician may see patients referred from the [facility] Emergency Department. Follow-up care is at the discretion of that Physician.   If you need additional assistance selecting a physician, you may call the Kelsey Cordoba [facility] [address] ([facility] [address]) [address] [facility] [address] [address]  [place] [address]) [phone]                Additional Information       We are concerned for your overall well being:    - If you are a smoker or

## (undated) NOTE — LETTER
05/22/20        Benji Cruz  [address] [facility]      Dear Mauricio Liang,    Our records indicate that you have outstanding lab work and or testing that was ordered for you and has not yet been completed:  Orders Placed This Encounter      BM

## (undated) NOTE — ED AVS SNAPSHOT
[facility] Emergency Department    [address] [address]    Phone:  [phone]    Fax:  [phone]           Karolyn Valles   MRN: [id number]    Department:  [facility] Emergency Department   Date of Visit:  3/10 and Class Registration line at [phone] or find a doctor online by visiting [email or web address].    IF THERE IS ANY CHANGE OR WORSENING OF YOUR CONDITION, CALL YOUR PRIMARY CARE PHYSICIAN AT ONCE OR RETURN IMMEDIATELY TO [address] [place] [address].     If

## (undated) NOTE — LETTER
07/20/20        Argelia Zamora  [address] [facility]      Dear Ricardo Longo,    Our records indicate that you have outstanding lab work and or testing that was ordered for you and has not yet been completed:  Orders Placed This Encounter      BM

## (undated) NOTE — Clinical Note
4/10/2017              Benji Cruz        [address] [facility]         Dear Mauricio Liang,    This letter is to inform you that our office has made several attempts to reach you by phone without success.   We were attempting to contact yo

## (undated) NOTE — LETTER
03/18/21        Breanne Led  [address] [facility]      Dear Christy Pagan,    Our records indicate that you have outstanding lab work and or testing that was ordered for you and has not yet been completed:  Orders Placed This Encounter      CM

## (undated) NOTE — LETTER
12/17/2020          To Whom It May Concern:    Breanne Olivo is currently under my medical care and may not return to work at this time. Please excuse Christy Pagan for 2 days. He may return to work on 12/21/2020. Activity is restricted as follows: none.